# Patient Record
Sex: FEMALE | HISPANIC OR LATINO | Employment: UNEMPLOYED
[De-identification: names, ages, dates, MRNs, and addresses within clinical notes are randomized per-mention and may not be internally consistent; named-entity substitution may affect disease eponyms.]

---

## 2018-07-15 ENCOUNTER — HEALTH MAINTENANCE LETTER (OUTPATIENT)
Age: 38
End: 2018-07-15

## 2018-08-23 ENCOUNTER — APPOINTMENT (OUTPATIENT)
Dept: GENERAL RADIOLOGY | Facility: CLINIC | Age: 38
End: 2018-08-23
Attending: EMERGENCY MEDICINE
Payer: MEDICAID

## 2018-08-23 ENCOUNTER — HOSPITAL ENCOUNTER (EMERGENCY)
Facility: CLINIC | Age: 38
Discharge: HOME | End: 2018-08-23
Attending: EMERGENCY MEDICINE | Admitting: EMERGENCY MEDICINE
Payer: MEDICAID

## 2018-08-23 VITALS
RESPIRATION RATE: 18 BRPM | HEART RATE: 82 BPM | OXYGEN SATURATION: 98 % | TEMPERATURE: 97.8 F | SYSTOLIC BLOOD PRESSURE: 152 MMHG | DIASTOLIC BLOOD PRESSURE: 87 MMHG

## 2018-08-23 DIAGNOSIS — R51.9 ACUTE NONINTRACTABLE HEADACHE, UNSPECIFIED HEADACHE TYPE: ICD-10-CM

## 2018-08-23 DIAGNOSIS — R42 LIGHTHEADEDNESS: ICD-10-CM

## 2018-08-23 DIAGNOSIS — R07.89 ATYPICAL CHEST PAIN: ICD-10-CM

## 2018-08-23 LAB
ANION GAP SERPL CALCULATED.3IONS-SCNC: 8 MMOL/L (ref 3–14)
B-HCG FREE SERPL-ACNC: <5 IU/L
BASOPHILS # BLD AUTO: 0 10E9/L (ref 0–0.2)
BASOPHILS NFR BLD AUTO: 0.3 %
BUN SERPL-MCNC: 10 MG/DL (ref 7–30)
CALCIUM SERPL-MCNC: 8.7 MG/DL (ref 8.5–10.1)
CHLORIDE SERPL-SCNC: 102 MMOL/L (ref 94–109)
CO2 SERPL-SCNC: 27 MMOL/L (ref 20–32)
CREAT SERPL-MCNC: 0.71 MG/DL (ref 0.52–1.04)
D DIMER PPP FEU-MCNC: <0.3 UG/ML FEU (ref 0–0.5)
DIFFERENTIAL METHOD BLD: NORMAL
EOSINOPHIL # BLD AUTO: 0.2 10E9/L (ref 0–0.7)
EOSINOPHIL NFR BLD AUTO: 2.5 %
ERYTHROCYTE [DISTWIDTH] IN BLOOD BY AUTOMATED COUNT: 13.3 % (ref 10–15)
GFR SERPL CREATININE-BSD FRML MDRD: >90 ML/MIN/1.7M2
GLUCOSE SERPL-MCNC: 96 MG/DL (ref 70–99)
HCT VFR BLD AUTO: 42.2 % (ref 35–47)
HGB BLD-MCNC: 13.5 G/DL (ref 11.7–15.7)
IMM GRANULOCYTES # BLD: 0 10E9/L (ref 0–0.4)
IMM GRANULOCYTES NFR BLD: 0.3 %
INTERPRETATION ECG - MUSE: NORMAL
LYMPHOCYTES # BLD AUTO: 1.8 10E9/L (ref 0.8–5.3)
LYMPHOCYTES NFR BLD AUTO: 26.4 %
MCH RBC QN AUTO: 28 PG (ref 26.5–33)
MCHC RBC AUTO-ENTMCNC: 32 G/DL (ref 31.5–36.5)
MCV RBC AUTO: 87 FL (ref 78–100)
MONOCYTES # BLD AUTO: 0.4 10E9/L (ref 0–1.3)
MONOCYTES NFR BLD AUTO: 6.4 %
NEUTROPHILS # BLD AUTO: 4.3 10E9/L (ref 1.6–8.3)
NEUTROPHILS NFR BLD AUTO: 64.1 %
NRBC # BLD AUTO: 0 10*3/UL
NRBC BLD AUTO-RTO: 0 /100
PLATELET # BLD AUTO: 378 10E9/L (ref 150–450)
POTASSIUM SERPL-SCNC: 3.5 MMOL/L (ref 3.4–5.3)
RBC # BLD AUTO: 4.83 10E12/L (ref 3.8–5.2)
SODIUM SERPL-SCNC: 137 MMOL/L (ref 133–144)
TROPONIN I SERPL-MCNC: <0.015 UG/L (ref 0–0.04)
WBC # BLD AUTO: 6.7 10E9/L (ref 4–11)

## 2018-08-23 PROCEDURE — 84484 ASSAY OF TROPONIN QUANT: CPT | Performed by: EMERGENCY MEDICINE

## 2018-08-23 PROCEDURE — 96374 THER/PROPH/DIAG INJ IV PUSH: CPT

## 2018-08-23 PROCEDURE — 85379 FIBRIN DEGRADATION QUANT: CPT | Performed by: EMERGENCY MEDICINE

## 2018-08-23 PROCEDURE — 99284 EMERGENCY DEPT VISIT MOD MDM: CPT | Mod: 25

## 2018-08-23 PROCEDURE — 80048 BASIC METABOLIC PNL TOTAL CA: CPT | Performed by: EMERGENCY MEDICINE

## 2018-08-23 PROCEDURE — 85025 COMPLETE CBC W/AUTO DIFF WBC: CPT | Performed by: EMERGENCY MEDICINE

## 2018-08-23 PROCEDURE — 71046 X-RAY EXAM CHEST 2 VIEWS: CPT

## 2018-08-23 PROCEDURE — 93005 ELECTROCARDIOGRAM TRACING: CPT

## 2018-08-23 PROCEDURE — 25000128 H RX IP 250 OP 636: Performed by: EMERGENCY MEDICINE

## 2018-08-23 PROCEDURE — 84702 CHORIONIC GONADOTROPIN TEST: CPT

## 2018-08-23 RX ORDER — LIDOCAINE 40 MG/G
CREAM TOPICAL
Status: DISCONTINUED | OUTPATIENT
Start: 2018-08-23 | End: 2018-08-23 | Stop reason: HOSPADM

## 2018-08-23 RX ORDER — KETOROLAC TROMETHAMINE 15 MG/ML
15 INJECTION, SOLUTION INTRAMUSCULAR; INTRAVENOUS ONCE
Status: COMPLETED | OUTPATIENT
Start: 2018-08-23 | End: 2018-08-23

## 2018-08-23 RX ADMIN — KETOROLAC TROMETHAMINE 15 MG: 15 INJECTION, SOLUTION INTRAMUSCULAR; INTRAVENOUS at 15:28

## 2018-08-23 ASSESSMENT — ENCOUNTER SYMPTOMS
SHORTNESS OF BREATH: 1
FEVER: 0
HEADACHES: 1
BACK PAIN: 0
ABDOMINAL PAIN: 0
NAUSEA: 0
COUGH: 0
VOMITING: 0
DIZZINESS: 1

## 2018-08-23 NOTE — ED PROVIDER NOTES
History     Chief Complaint:  Multiple Complaints    HPI   Bindu Forte is a 38 year old female who presents with multiple complaints. The patient stated that she has had left sided chest pain for roughly a month, along with lightheadedness and shortness of breath. Both these symptoms worsen wit activity, as stated by the patient. Along with the chest pain and lightheadedness, she had been having intermittent headaches for the past 2 weeks, raising concern. Today, she had an episode of lightheadedness that made her think she was going to faint, prompting her to visit the ED today. The patient stated that her father passed away 3 months ago, and that she has had a lot of other stressors that could be playing in a part in her symptoms. The patient also shared that she had her IUD taken out 2 weeks ago. The patient denies any urinary or bowel symptoms, cough, fevers, abdominal pain, back pain, numbness, or any other associated symptoms.     Allergies:  No Known Drug Allergies    Medications:    Mirena    Past Medical History:    Postpartum depression    Past Surgical History:    History reviewed. No pertinent past surgical history.    Family History:    The patient denies any relevant family medical history.    Social History:  Marital Status:   Smoking Status: Never  Alcohol Use: No    Review of Systems   Constitutional: Negative for fever.   Respiratory: Positive for shortness of breath. Negative for cough.    Cardiovascular: Positive for chest pain.   Gastrointestinal: Negative for abdominal pain, nausea and vomiting.   Musculoskeletal: Negative for back pain.   Neurological: Positive for dizziness and headaches.   All other systems reviewed and are negative.    Physical Exam     Patient Vitals for the past 24 hrs:   BP Temp Pulse Heart Rate Resp SpO2   08/23/18 1515 - - - - - 100 %   08/23/18 1405 152/87 97.8  F (36.6  C) 82 82 18 100 %       Physical Exam  General: Adult female sitting  upright.  Eyes: PERRL, Conjunctive within normal limits  ENT: Moist mucous membranes, oropharynx clear.   Neck: no rigidity. Nontender.  CV: Normal S1S2, no murmur, rub or gallop. Regular rate and rhythm. Radial pulses equal and intact.  Resp: Clear to auscultation bilaterally, no wheezes, rales or rhonchi. Normal respiratory effort.  GI: Abdomen is soft, nontender and nondistended. No palpable masses. No rebound or guarding.  MSK: No edema. Nontender. Normal active range of motion. No calf asymmetry or tenderness to palpation.   Skin: Warm and dry. No rashes or lesions or ecchymoses on visible skin.  Neuro: Alert and oriented. Responds appropriately to all questions and commands. No focal findings appreciated. Normal muscle tone.  Psych: Normal mood and affect. Pleasant.      Emergency Department Course   ECG:  ECG taken at 1412, ECG read at 1444  Normal sinus rhythm  Nonspecific T wave abnormality  Prolonged QT  Abnormal ECG  Rate 92 bpm. RI interval 128. QRS duration 74. QT/QTc 374/462. P-R-T axes 30 80 22.    Imaging:   Radiographic findings were communicated with the patient who voiced understanding of the findings.    Chest XR, PA & LAT:  Since August 13, 2005, heart size remains normal. No  pleural effusion, pneumothorax, or abnormal area of consolidation.  Per Radiologist.     Laboratory:  CBC: WNL. (WBC 6.7, HGB 13.5, )   BMP: AW NL. (Creatinine 0.71)  D dimer: <0.3  Troponin 1: <0.015  HCG: <5.0    Interventions:  1528: 15 mg Toradol IV    Emergency Department Course:  Past medical records, nursing notes, and vitals reviewed.  1440: I performed an exam of the patient and obtained history, as documented above.    EKG obtained in the ED, see results above.   IV inserted and blood drawn.  The patient was taken for chest XR, see imaging results above.     Patient reassessed. No new concerns. Feels improved.     I rechecked the patient. Findings and plan explained to the Patient. Patient discharged home  with instructions regarding supportive care, medications, and reasons to return. The importance of close follow-up was reviewed.       Impression & Plan      Medical Decision Making:  Bindu Forte is a 38 year old female with a previous history of headaches who presents to the ED for evaluation of concern for headache dizziness and chest pain. It has been intermittent for the last month. Her father  3 moths ago and has been dealing with many situational stressors but is not suicidal or severely depressed. She does not have an established PCP or someone to talk to about this. With regards to her chest pain headache and dizziness, there are no worry some findings on screening test to suggest acute cardio pulmonary cause for symptoms. She also has no neurologic deficits and she describes the headache as mild and it comes and goes. There's no indication for head CT today. Head CT from  was reviewed. She had no EKG abnormalities or concerning findings. It appears that she was hydrated here, and in the case she was mildly dehydrated. She was feeling improved on re assessment. She is recommended to follow up with PCP within 3 days. Come to the ED if any new or worsening symptoms. All questions answered prior to discharge.     Diagnosis:    ICD-10-CM    1. Acute nonintractable headache, unspecified headache type R51    2. Lightheadedness R42    3. Atypical chest pain R07.89        Disposition:  discharged to home          Milton Alanis  2018   [facility] EMERGENCY DEPARTMENT    I, Milton Alanis, am serving as a scribe at 2:40 PM on 2018 to document services personally performed by Jenna Rahman MD based on my observations and the provider's statements to me.        Jenna Rahman MD  18 1548

## 2018-08-23 NOTE — ED TRIAGE NOTES
Patient presents with complaints of chest pain for a month. This has not been evaluated yet. Patient also states that she has been having a headache and dizziness for a week as well.  ABC intact without need for intervention at this time.

## 2018-08-23 NOTE — LETTER
08/23/18      To Whom it may concern:    Taras Lyons was in our Emergency Department today, 08/23/18. with a patient who needed their assistance.  Please excuse them from work.      Sincerely,      Lilly Can RN, BSN

## 2018-08-23 NOTE — DISCHARGE INSTRUCTIONS
* Dolor En El Pecho:Causa No Determinada [Chest Pain, Uncertain Cause]    Según mehta examen de hoy, no se pudo determinar exactamente por qué siente dolor en el pecho. Mehta afección no parece seria en kiley momento y el dolor que siente no parece provenir del corazón. Sin embargo, en ocasiones, los síntomas de un problema keegan tardan más en aparecer. Por lo tanto, es importante que preste atención a las advertencias descritas a continuación.  Cuidados En [place]:  1. Descanse hoy y evite toda actividad agotadora.  2. [place] el medicamento que le hayan recetado según le hayan indicado.  Programe randal VISITA DE CONTROL con mehta médico en 1-3 días.  Busque Prontamente Atención Médica  si algo de lo siguiente ocurre:    Un cambio en el tipo de dolor: se siente diferente, se ha vuelto más ethan, dura más o comienza a esparcirse al hombro, el brazo, el corby, la mandíbula o la espalda.    Dificultad para respirar o más dolor al respirar.    Debilidad, mareo o desmayo.    Tos con expulsión de esputo (flema [phlegm]) de color oscuro o con bob.    Fiebre de 101 F (38.3 C) o más leo.    Dolor, enrojecimiento o hinchazón de randal pierna.    0338-0352 [employer]. [address] [place] [address], [place] [address]. All rights reserved. This information is not intended as a substitute for professional medical care. Always follow your healthcare professional's instructions.  This information has been modified by your health care provider with permission from the publisher.          Mareo [Dizziness, Uncertain Cause]  El mareo (dizziness) es un síntoma común, que en ocasiones se describe pedrito  sentirse aturdido  (lightheaded) o  sentir que sabrina está a punto de desmayarse  (faint). Si dura sólo unos segundos y se relaciona con un cambio de posición (por ejemplo, levantarse después de rashaun estado mucho tiempo sentado o acostado), no suele ser signo de ninguna afección seria. En cambio, un mareo (dizziness) que dura minutos u  horas, o que aparece sin motivo aparente, puede ser randal señal de un problema más keegan (por ejemplo, deshidratación [dehydration], randal reacción a un medicamento, randal enfermedad del corazón [heart disease] o del cerebro [brain disease]).  El examen que le hicieron hoy no nos permitió establecer con certeza a qué se debió mehta mareo. En ocasiones, es necesario hacer más pruebas para poder encontrar la causa. Por lo tanto, es importante que dung las visitas de control con mehta médico si los síntomas continúan.  Cuidados En [place]:  1) Si tiene un mareo que dura más que unos pocos segundos, recuéstese hasta que haya pasado. Así, si llegase a desmayarse, no se lastimará con la caída.  2) No conduzca ni opere ninguna máquina peligrosa hasta que los mareos hayan cesado raad, al menos, 48 horas.  3) Si usted se marea al ponerse de pie en forma repentina, puede ser un signo de deshidratación leve (mild dehydration). En los próximos días, francine cantidades adicionales de líquidos.  4) Si hace poco que comenzó a lily un medicamento nuevo o le aumentaron la dosis de mehta medicamento actual (especialmente, si se trata de un medicamento para tratar la presión arterial [blood pressure medicine]), explique henrik síntomas al médico que le recetó el medicamento. Quizás deba ajustarle la dosis.  Si los síntomas continúan, programe randal VISITA DE CONTROL con mehta médico en los próximos siete días para que lo evalúen mejor.  Busque Prontamente Atención Médica  si algo de lo siguiente ocurre:  -- Los síntomas empeoran.  -- Desmayo, dolor de stephon o convulsiones (seizure).  -- Vómito persistente.  -- Siente que usted o el cuarto stephanie vueltas.  -- Dolor en el pecho, el brazo, la espalda, el corby o la mandíbula.  -- Palpitaciones (palpitations) [la sensación de que el corazón está agitado, late con rapidez o ethan].  -- Falta de aire.  -- Matthew en el vómito o en la materia fecal (de color negruzco o rojizo).  -- Debilidad en un brazo o randal  juanita, o en un lado de la aquiles.  -- Dificultades para hablar o jonny.  Date Last Reviewed: 8/23/2015 2000-2017 [employer]. [address] [place] [id number]. Todos los derechos reservados. Esta información no pretende sustituir la atención médica profesional. Sólo mehta médico puede diagnosticar y tratar un problema de rodolfo.          * Dolor De Brian [Headache, Unspecified]    No se ha podido determinar con certeza cuál es la causa de mehta dolor de brian de timmy, eddie no parece rashaun señales de randal enfermedad seria.  Con el estrés, algunas personas tensan los músculos de los hombros, del corby y el cuero cabelludo sin darse cuenta. Si lo hacen raad cierto tiempo, puede darse un DOLOR DE BRIAN POR TENSIÓN (TENSION HEADACHE).  El DOLOR DE BRIAN POR MIGRAÑA (MIGRAINE HEADACHE) se debe a cambios en el flujo sanguíneo del cerebro. Un ataque de migraña puede desencadenarse por estrés emocional, cambios hormonales raad el ciclo menstrual, anticonceptivos orales, el consumo de alcohol, ciertos alimentos que contienen tiramina (tyramine), esfuerzo de los ojos, cambios del clima, saltearse comidas, falta de sueño o dormir demasiadas horas.  Otras causas que pueden ocasionar un dolor de brian son: randal enfermedad viral (viral illness),randal infección de los senos paranasales (sinus infection), del oído (ear infection) o de la garganta (throat infection), dolor en los dientes o muelas (dental pain) y dolor en la mandíbula (jaw joint pain).  Cuidados En [place]:  1. Si le dieron algún calmante (analgésico [pain medicine]) para kiley dolor de brian, no conduzca hasta mehta casa. En cambio, coordine para que otra persona lo lleve. Cuando llegue a mehta casa, intente dormir. Se sentirá mucho mejor cuando despierte.  2. Si tiene náuseas (nausea) o vómito (vomiting), siga radnal dieta liviana hasta que el dolor de brian haya desaparecido.  3. Si tiene un dolor de brian de tipo migraña, use lentes de sol cuando  salga a la awilda del día o se encuentre en lugares interiores con iluminación brillante, hasta que henrik síntomas se alivien. La awilda nicole y muy brillante puede empeorar kiley tipo de dolor de stephon.  Programe randal VISITA DE CONTROL con mehta médico si no empieza a sentirse mejor raad las próximas 24 horas. Si tiene cadence de stephon frecuentes, debería conversar sobre un plan de tratamiento con mehta médico de atención de primaria. Si está atento a los primeros signos del dolor de stephon y comienza el tratamiento de inmediato, quizás pueda detener el dolor usted mismo.  Busque Prontamente Atención Médica  si algo de lo siguiente ocurre:    El dolor de stephon empeora o no se courtney en las próximas 24 horas.    Vómito persistente (no puede mantener los líquidos en el estómago).    Fiebre de 101 F (38.3 C) o más leo.    Rigidez en el corby.    Gran somnolencia, confusión, o desmayo.    Debilidad en un brazo o randal pierna, o en un lado de la aquiles.    Dificultades para hablar o jonny.    9388-4352 [employer]. [address] [place] [address], [place] [address]. All rights reserved. This information is not intended as a substitute for professional medical care. Always follow your healthcare professional's instructions.  This information has been modified by your health care provider with permission from the publisher.

## 2018-08-23 NOTE — ED AVS SNAPSHOT
[facility] Emergency Department    [address]    [facility] [address]    Phone:  [phone]    Fax:  [phone]                                       Bindu Forte   MRN: [id number]    Department:  [facility] Emergency Department   Date of Visit:  8/23/2018           After Visit Summary Signature Page     I have received my discharge instructions, and my questions have been answered. I have discussed any challenges I see with this plan with the nurse or doctor.    ..........................................................................................................................................  Patient/Patient Representative Signature      ..........................................................................................................................................  Patient Representative Print Name and Relationship to Patient    ..................................................               ................................................  Date                                            Time    ..........................................................................................................................................  Reviewed by Signature/Title    ...................................................              ..............................................  Date                                                            Time          22EPIC Rev 08/18

## 2018-08-23 NOTE — ED AVS SNAPSHOT
[facility] Emergency Department    [address]    [facility] [address]    Phone:  [phone]    Fax:  [phone]                                       Bindu Forte   MRN: [id number]    Department:  [facility] Emergency Department   Date of Visit:  8/23/2018           Patient Information     Date Of Birth          1980        Your diagnoses for this visit were:     Acute nonintractable headache, unspecified headache type     Lightheadedness     Atypical chest pain        You were seen by Jenna Rahman MD.      Follow-up Information     Follow up with Primary clinic.    Why:  within 3 days for reassessment        Follow up with [facility] Emergency Department.    Specialty:  EMERGENCY MEDICINE    Why:  As needed, If symptoms worsen    Contact information:    [address] [facility] [address]  [phone]        Discharge Instructions         * Dolor En El Pecho:Causa No Determinada [Chest Pain, Uncertain Cause]    Según alexander examen de timmy, no se pudo determinar exactamente por qué siente dolor en el pecho. Alexander afección no parece seria en kiley momento y el dolor que siente no parece provenir del corazón. Sin embargo, en ocasiones, los síntomas de un problema keegan tardan más en aparecer. Por lo tanto, es importante que preste atención a las advertencias descritas a continuación.  Cuidados En [place]:  1. Descanse hoy y evite toda actividad agotadora.  2. [place] el medicamento que le hayan recetado según le hayan indicado.  Programe randal VISITA DE CONTROL con alexander médico en 1-3 días.  Busque Prontamente Atención Médica  si algo de lo siguiente ocurre:    Un cambio en el tipo de dolor: se siente diferente, se ha vuelto más ethan, dura más o comienza a esparcirse al hombro, el brazo, el corby, la mandíbula o la espalda.    Dificultad para respirar o más dolor al respirar.    Debilidad, mareo o desmayo.    Tos con expulsión de  esputo (flema [phlegm]) de color oscuro o con bob.    Fiebre de 101 F (38.3 C) o más leo.    Dolor, enrojecimiento o hinchazón de randal juanita.    5585-4901 [employer]. [address] [place] [address] 18573. All rights reserved. This information is not intended as a substitute for professional medical care. Always follow your healthcare professional's instructions.  This information has been modified by your health care provider with permission from the publisher.          Mareo [Dizziness, Uncertain Cause]  El mareo (dizziness) es un síntoma común, que en ocasiones se describe pedrito  sentirse aturdido  (lightheaded) o  sentir que sabrina está a punto de desmayarse  (faint). Si dura sólo unos segundos y se relaciona con un cambio de posición (por ejemplo, levantarse después de rashaun estado mucho tiempo sentado o acostado), no suele ser signo de ninguna afección seria. En cambio, un mareo (dizziness) que dura minutos u horas, o que aparece sin motivo aparente, puede ser randal señal de un problema más keegan (por ejemplo, deshidratación [dehydration], randal reacción a un medicamento, randal enfermedad del corazón [heart disease] o del cerebro [brain disease]).  El examen que le hicieron hoy no nos permitió establecer con certeza a qué se debió mehta mareo. En ocasiones, es necesario hacer más pruebas para poder encontrar la causa. Por lo tanto, es importante que dung las visitas de control con mehta médico si los síntomas continúan.  Cuidados En La [place]:  1) Si tiene un mareo que dura más que unos pocos segundos, recuéstese hasta que haya pasado. Así, si llegase a desmayarse, no se lastimará con la caída.  2) No conduzca ni opere ninguna máquina peligrosa hasta que los mareos hayan cesado raad, al menos, 48 horas.  3) Si usted se marea al ponerse de pie en forma repentina, puede ser un signo de deshidratación leve (mild dehydration). En los próximos días, francine cantidades adicionales de líquidos.  4) Si hace poco  que comenzó a lily un medicamento nuevo o le aumentaron la dosis de mehta medicamento actual (especialmente, si se trata de un medicamento para tratar la presión arterial [blood pressure medicine]), explique henrik síntomas al médico que le recetó el medicamento. Quizás deba ajustarle la dosis.  Si los síntomas continúan, programe randal VISITA DE CONTROL con mehta médico en los próximos siete días para que lo evalúen mejor.  Busque Prontamente Atención Médica  si algo de lo siguiente ocurre:  -- Los síntomas empeoran.  -- Desmayo, dolor de brian o convulsiones (seizure).  -- Vómito persistente.  -- Siente que usted o el cuarto stephanie vueltas.  -- Dolor en el pecho, el brazo, la espalda, el corby o la mandíbula.  -- Palpitaciones (palpitations) [la sensación de que el corazón está agitado, late con rapidez o ethan].  -- Falta de aire.  -- Matthew en el vómito o en la materia fecal (de color negruzco o rojizo).  -- Debilidad en un brazo o randal pierna, o en un lado de la aquiles.  -- Dificultades para hablar o jonny.  Date Last Reviewed: 8/23/2015 2000-2017 [employer]. [address] [place] [address] 19205. Todos los derechos reservados. Esta información no pretende sustituir la atención médica profesional. Sólo mehta médico puede diagnosticar y tratar un problema de rodolfo.          * Dolor De Brian [Headache, Unspecified]    No se ha podido determinar con certeza cuál es la causa de mehta dolor de brian de hoy, eddie no parece rashaun señales de randal enfermedad seria.  Con el estrés, algunas personas tensan los músculos de los hombros, del corby y el cuero cabelludo sin darse cuenta. Si lo hacen raad cierto tiempo, puede darse un DOLOR DE BRIAN POR TENSIÓN (TENSION HEADACHE).  El DOLOR DE BRIAN POR MIGRAÑA (MIGRAINE HEADACHE) se debe a cambios en el flujo sanguíneo del cerebro. Un ataque de migraña puede desencadenarse por estrés emocional, cambios hormonales raad el ciclo menstrual, anticonceptivos orales, el  consumo de alcohol, ciertos alimentos que contienen tiramina (tyramine), esfuerzo de los ojos, cambios del clima, saltearse comidas, falta de sueño o dormir demasiadas horas.  Otras causas que pueden ocasionar un dolor de stephon son: randal enfermedad viral (viral illness),randal infección de los senos paranasales (sinus infection), del oído (ear infection) o de la garganta (throat infection), dolor en los dientes o muelas (dental pain) y dolor en la mandíbula (jaw joint pain).  Cuidados En [place]:  1. Si le dieron algún calmante (analgésico [pain medicine]) para kiley dolor de stephon, no conduzca hasta mehta casa. En cambio, coordine para que otra persona lo lleve. Cuando llegue a mehta casa, intente dormir. Se sentirá mucho mejor cuando despierte.  2. Si tiene náuseas (nausea) o vómito (vomiting), siga randal dieta liviana hasta que el dolor de stephon haya desaparecido.  3. Si tiene un dolor de stephon de tipo migraña, use lentes de sol cuando salga a la awilda del día o se encuentre en lugares interiores con iluminación brillante, hasta que henrik síntomas se alivien. La awilda nicole y muy brillante puede empeorar kiley tipo de dolor de stephon.  Programe randal VISITA DE CONTROL con mehta médico si no empieza a sentirse mejor raad las próximas 24 horas. Si tiene cadence de stephon frecuentes, debería conversar sobre un plan de tratamiento con mehta médico de atención de primaria. Si está atento a los primeros signos del dolor de stephon y comienza el tratamiento de inmediato, quizás pueda detener el dolor usted mismo.  Busque Prontamente Atención Médica  si algo de lo siguiente ocurre:    El dolor de stephon empeora o no se courtney en las próximas 24 horas.    Vómito persistente (no puede mantener los líquidos en el estómago).    Fiebre de 101 F (38.3 C) o más leo.    Rigidez en el corby.    Gran somnolencia, confusión, o desmayo.    Debilidad en un brazo o randal pierna, o en un lado de la aquiles.    Dificultades para hablar o jonny.    [id number] [employer]. [address] [place] [address] 36559. All rights reserved. This information is not intended as a substitute for professional medical care. Always follow your healthcare professional's instructions.  This information has been modified by your health care provider with permission from the publisher.          Discharge References/Attachments     GRIEF AND LOSS ([place])    GRIEF AND LOSS, HELPING YOURSELF THROUGH ([place])      24 Hour Appointment Hotline       To make an appointment at any [facility], call [phone] ([phone]). If you don't have a family doctor or clinic, we will help you find one. [facility] clinics are conveniently located to serve the needs of you and your family.             Review of your medicines      Our records show that you are taking the medicines listed below. If these are incorrect, please call your family doctor or clinic.        Dose / Directions Last dose taken    ibuprofen 800 MG tablet   Commonly known as:  ADVIL/MOTRIN   Dose:  800 mg   Quantity:  90 tablet        Take 1 tablet (800 mg) by mouth once as needed for moderate pain (mild-moderate pain)   Refills:  0        levonorgestrel 20 MCG/24HR IUD   Commonly known as:  MIRENA   Dose:  1 each   Quantity:  1 each        1 each (20 mcg) by Intrauterine route once for 1 dose   Refills:  0        prenatal multivitamin plus iron 27-0.8 MG Tabs per tablet   Dose:  1 tablet   Quantity:  100 tablet        Take 1 tablet by mouth daily   Refills:  3                Procedures and tests performed during your visit     Basic metabolic panel    CBC with platelets differential    Chest XR,  PA & LAT    D dimer quantitative    EKG 12-lead, tracing only    ISTAT HCG Quantitative Pregnancy POCT    Peripheral IV catheter    Pulse oximetry nursing    Troponin I (now)      Orders Needing Specimen Collection     None      Pending Results     Date and Time Order Name Status Description    8/23/2018 0670 Chest XR,   PA & LAT Preliminary             Pending Culture Results     No orders found from 8/21/2018 to 8/24/2018.            Pending Results Instructions     If you had any lab results that were not finalized at the time of your Discharge, you can call the ED Lab Result RN at [phone]. You will be contacted by this team for any positive Lab results or changes in treatment. The nurses are available 7 days a week from 10A to 6:30P.  You can leave a message 24 hours per day and they will return your call.        Test Results From Your Hospital Stay        8/23/2018  3:17 PM      Component Results     Component Value Ref Range & Units Status    WBC 6.7 4.0 - 11.0 10e9/L Final    RBC Count 4.83 3.8 - 5.2 10e12/L Final    Hemoglobin 13.5 11.7 - 15.7 g/dL Final    Hematocrit 42.2 35.0 - 47.0 % Final    MCV 87 78 - 100 fl Final    MCH 28.0 26.5 - 33.0 pg Final    MCHC 32.0 31.5 - 36.5 g/dL Final    RDW 13.3 10.0 - 15.0 % Final    Platelet Count 378 150 - 450 10e9/L Final    Diff Method Automated Method  Final    % Neutrophils 64.1 % Final    % Lymphocytes 26.4 % Final    % Monocytes 6.4 % Final    % Eosinophils 2.5 % Final    % Basophils 0.3 % Final    % Immature Granulocytes 0.3 % Final    Nucleated RBCs 0 0 /100 Final    Absolute Neutrophil 4.3 1.6 - 8.3 10e9/L Final    Absolute Lymphocytes 1.8 0.8 - 5.3 10e9/L Final    Absolute Monocytes 0.4 0.0 - 1.3 10e9/L Final    Absolute Eosinophils 0.2 0.0 - 0.7 10e9/L Final    Absolute Basophils 0.0 0.0 - 0.2 10e9/L Final    Abs Immature Granulocytes 0.0 0 - 0.4 10e9/L Final    Absolute Nucleated RBC 0.0  Final         8/23/2018  3:33 PM      Component Results     Component Value Ref Range & Units Status    Sodium 137 133 - 144 mmol/L Final    Potassium 3.5 3.4 - 5.3 mmol/L Final    Chloride 102 94 - 109 mmol/L Final    Carbon Dioxide 27 20 - 32 mmol/L Final    Anion Gap 8 3 - 14 mmol/L Final    Glucose 96 70 - 99 mg/dL Final    Urea Nitrogen 10 7 - 30 mg/dL Final    Creatinine 0.71  0.52 - 1.04 mg/dL Final    GFR Estimate >90 >60 mL/min/1.7m2 Final    Non  GFR Calc    GFR Estimate If Black >90 >60 mL/min/1.7m2 Final    African American GFR Calc    Calcium 8.7 8.5 - 10.1 mg/dL Final         8/23/2018  3:30 PM      Component Results     Component Value Ref Range & Units Status    D Dimer <0.3 0.0 - 0.50 ug/ml FEU Final    This D-dimer assay is intended for use in conjunction with a clinical pretest   probability assessment model to exclude pulmonary embolism (PE) and deep   venous thrombosis (DVT) in outpatients suspected of PE or DVT. The cut-off   value is 0.5 ug/mL FEU.           8/23/2018  3:36 PM      Component Results     Component Value Ref Range & Units Status    Troponin I ES <0.015 0.000 - 0.045 ug/L Final    The 99th percentile for upper reference range is 0.045 ug/L.  Troponin values   in the range of 0.045 - 0.120 ug/L may be associated with risks of adverse   clinical events.           8/23/2018  4:13 PM      Narrative     CHEST TWO VIEWS  8/23/2018 4:09 PM     HISTORY: 38-year-old woman with chest pain.         Impression     IMPRESSION: Since August 13, 2005, heart size remains normal. No  pleural effusion, pneumothorax, or abnormal area of consolidation.         8/23/2018  3:22 PM      Component Results     Component Value Ref Range & Units Status    HCG Quantitative Serum <5.0 <5.0 IU/L Final                Clinical Quality Measure: Blood Pressure Screening     Your blood pressure was checked while you were in the emergency department today. The last reading we obtained was  BP: 152/87 . Please read the guidelines below about what these numbers mean and what you should do about them.  If your systolic blood pressure (the top number) is less than 120 and your diastolic blood pressure (the bottom number) is less than 80, then your blood pressure is normal. There is nothing more that you need to do about it.  If your systolic blood pressure (the top number) is  "120-139 or your diastolic blood pressure (the bottom number) is 80-89, your blood pressure may be higher than it should be. You should have your blood pressure rechecked within a year by a primary care provider.  If your systolic blood pressure (the top number) is 140 or greater or your diastolic blood pressure (the bottom number) is 90 or greater, you may have high blood pressure. High blood pressure is treatable, but if left untreated over time it can put you at risk for heart attack, stroke, or kidney failure. You should have your blood pressure rechecked by a primary care provider within the next 4 weeks.  If your provider in the emergency department today gave you specific instructions to follow-up with your doctor or provider even sooner than that, you should follow that instruction and not wait for up to 4 weeks for your follow-up visit.        Thank you for choosing [facility]       Thank you for choosing [facility] for your care. Our goal is always to provide you with excellent care. Hearing back from our patients is one way we can continue to improve our services. Please take a few minutes to complete the written survey that you may receive in the mail after you visit with us. Thank you!        [employer] Information     [employer] lets you send messages to your doctor, view your test results, renew your prescriptions, schedule appointments and more. To sign up, go to [email or web address] . Click on \"Log in\" on the left side of the screen, which will take you to the Welcome page. Then click on \"Sign up Now\" on the right side of the page.     You will be asked to enter the access code listed below, as well as some personal information. Please follow the directions to create your username and password.     Your access code is: [email or web address]  Expires: 2018  4:24 PM     Your access code will  in 90 days. If you need help or a new code, please call your [facility] clinic or [phone].        Care " EveryWhere ID     This is your Care EveryWhere ID. This could be used by other organizations to access your [place] medical records  [id number]        Equal Access to Services     DALILA REICH : Gail Vargas, vale faith, jaylyn wolfe, blanca lou. So [facility] [phone].    ATENCIÓN: Si habla español, tiene a mehta disposición servicios gratuitos de asistencia lingüística. Llame al [phone].    We comply with applicable federal civil rights laws and [place] laws. We do not discriminate on the basis of race, color, national origin, age, disability, sex, sexual orientation, or gender identity.            After Visit Summary       This is your record. Keep this with you and show to your community pharmacist(s) and doctor(s) at your next visit.

## 2018-10-01 ENCOUNTER — TRANSFERRED RECORDS (OUTPATIENT)
Dept: HEALTH INFORMATION MANAGEMENT | Facility: CLINIC | Age: 38
End: 2018-10-01

## 2018-10-01 LAB — PAP SMEAR - HIM PATIENT REPORTED: NEGATIVE

## 2019-01-10 ENCOUNTER — HOSPITAL ENCOUNTER (EMERGENCY)
Facility: CLINIC | Age: 39
Discharge: HOME | End: 2019-01-11
Attending: EMERGENCY MEDICINE | Admitting: EMERGENCY MEDICINE

## 2019-01-10 ENCOUNTER — APPOINTMENT (OUTPATIENT)
Dept: ULTRASOUND IMAGING | Facility: CLINIC | Age: 39
End: 2019-01-10

## 2019-01-10 ENCOUNTER — APPOINTMENT (OUTPATIENT)
Dept: GENERAL RADIOLOGY | Facility: CLINIC | Age: 39
End: 2019-01-10

## 2019-01-10 VITALS
HEIGHT: 66 IN | SYSTOLIC BLOOD PRESSURE: 120 MMHG | HEART RATE: 80 BPM | OXYGEN SATURATION: 100 % | BODY MASS INDEX: 24.69 KG/M2 | DIASTOLIC BLOOD PRESSURE: 72 MMHG | TEMPERATURE: 99.3 F | RESPIRATION RATE: 18 BRPM

## 2019-01-10 DIAGNOSIS — R05.9 COUGH: ICD-10-CM

## 2019-01-10 DIAGNOSIS — Z3A.01 LESS THAN 8 WEEKS GESTATION OF PREGNANCY: ICD-10-CM

## 2019-01-10 DIAGNOSIS — J11.1 INFLUENZA-LIKE ILLNESS: ICD-10-CM

## 2019-01-10 LAB
ALBUMIN UR-MCNC: NEGATIVE MG/DL
ANION GAP SERPL CALCULATED.3IONS-SCNC: 7 MMOL/L (ref 3–14)
APPEARANCE UR: CLEAR
B-HCG FREE SERPL-ACNC: >2000 IU/L
B-HCG SERPL-ACNC: 9858 IU/L (ref 0–5)
BACTERIA #/AREA URNS HPF: ABNORMAL /HPF
BASOPHILS # BLD AUTO: 0 10E9/L (ref 0–0.2)
BASOPHILS NFR BLD AUTO: 0.3 %
BILIRUB UR QL STRIP: NEGATIVE
BUN SERPL-MCNC: 9 MG/DL (ref 7–30)
CALCIUM SERPL-MCNC: 8.6 MG/DL (ref 8.5–10.1)
CHLORIDE SERPL-SCNC: 102 MMOL/L (ref 94–109)
CO2 SERPL-SCNC: 27 MMOL/L (ref 20–32)
COLOR UR AUTO: YELLOW
CREAT SERPL-MCNC: 0.68 MG/DL (ref 0.52–1.04)
DIFFERENTIAL METHOD BLD: NORMAL
EOSINOPHIL # BLD AUTO: 0.3 10E9/L (ref 0–0.7)
EOSINOPHIL NFR BLD AUTO: 5.5 %
ERYTHROCYTE [DISTWIDTH] IN BLOOD BY AUTOMATED COUNT: 13.7 % (ref 10–15)
GFR SERPL CREATININE-BSD FRML MDRD: >90 ML/MIN/{1.73_M2}
GLUCOSE SERPL-MCNC: 92 MG/DL (ref 70–99)
GLUCOSE UR STRIP-MCNC: NEGATIVE MG/DL
HCT VFR BLD AUTO: 42.7 % (ref 35–47)
HGB BLD-MCNC: 13.5 G/DL (ref 11.7–15.7)
HGB UR QL STRIP: ABNORMAL
IMM GRANULOCYTES # BLD: 0.1 10E9/L (ref 0–0.4)
IMM GRANULOCYTES NFR BLD: 0.8 %
KETONES UR STRIP-MCNC: NEGATIVE MG/DL
LEUKOCYTE ESTERASE UR QL STRIP: NEGATIVE
LYMPHOCYTES # BLD AUTO: 1.4 10E9/L (ref 0.8–5.3)
LYMPHOCYTES NFR BLD AUTO: 23.7 %
MCH RBC QN AUTO: 27.7 PG (ref 26.5–33)
MCHC RBC AUTO-ENTMCNC: 31.6 G/DL (ref 31.5–36.5)
MCV RBC AUTO: 88 FL (ref 78–100)
MONOCYTES # BLD AUTO: 0.7 10E9/L (ref 0–1.3)
MONOCYTES NFR BLD AUTO: 11.4 %
MUCOUS THREADS #/AREA URNS LPF: PRESENT /LPF
NEUTROPHILS # BLD AUTO: 3.5 10E9/L (ref 1.6–8.3)
NEUTROPHILS NFR BLD AUTO: 58.3 %
NITRATE UR QL: NEGATIVE
NRBC # BLD AUTO: 0 10*3/UL
NRBC BLD AUTO-RTO: 0 /100
PH UR STRIP: 5 PH (ref 5–7)
PLATELET # BLD AUTO: 365 10E9/L (ref 150–450)
POTASSIUM SERPL-SCNC: 3.4 MMOL/L (ref 3.4–5.3)
RBC # BLD AUTO: 4.88 10E12/L (ref 3.8–5.2)
RBC #/AREA URNS AUTO: 1 /HPF (ref 0–2)
SODIUM SERPL-SCNC: 136 MMOL/L (ref 133–144)
SOURCE: ABNORMAL
SP GR UR STRIP: 1.02 (ref 1–1.03)
SQUAMOUS #/AREA URNS AUTO: 5 /HPF (ref 0–1)
UROBILINOGEN UR STRIP-MCNC: NEGATIVE MG/DL (ref 0–2)
WBC # BLD AUTO: 6 10E9/L (ref 4–11)
WBC #/AREA URNS AUTO: 1 /HPF (ref 0–5)

## 2019-01-10 PROCEDURE — 80048 BASIC METABOLIC PNL TOTAL CA: CPT | Performed by: EMERGENCY MEDICINE

## 2019-01-10 PROCEDURE — 81001 URINALYSIS AUTO W/SCOPE: CPT | Performed by: EMERGENCY MEDICINE

## 2019-01-10 PROCEDURE — 71046 X-RAY EXAM CHEST 2 VIEWS: CPT

## 2019-01-10 PROCEDURE — 96360 HYDRATION IV INFUSION INIT: CPT

## 2019-01-10 PROCEDURE — 99285 EMERGENCY DEPT VISIT HI MDM: CPT | Mod: 25

## 2019-01-10 PROCEDURE — 25000128 H RX IP 250 OP 636: Performed by: EMERGENCY MEDICINE

## 2019-01-10 PROCEDURE — 76817 TRANSVAGINAL US OBSTETRIC: CPT

## 2019-01-10 PROCEDURE — 85025 COMPLETE CBC W/AUTO DIFF WBC: CPT | Performed by: EMERGENCY MEDICINE

## 2019-01-10 PROCEDURE — 84702 CHORIONIC GONADOTROPIN TEST: CPT | Performed by: EMERGENCY MEDICINE

## 2019-01-10 PROCEDURE — 84702 CHORIONIC GONADOTROPIN TEST: CPT

## 2019-01-10 RX ORDER — ACETAMINOPHEN 325 MG/1
325-650 TABLET ORAL EVERY 6 HOURS PRN
COMMUNITY
End: 2019-04-03

## 2019-01-10 RX ORDER — ACETAMINOPHEN 325 MG/1
650 TABLET ORAL EVERY 6 HOURS PRN
Qty: 30 TABLET | Refills: 0 | Status: SHIPPED | OUTPATIENT
Start: 2019-01-10 | End: 2019-04-03

## 2019-01-10 RX ADMIN — SODIUM CHLORIDE 1000 ML: 9 INJECTION, SOLUTION INTRAVENOUS at 21:35

## 2019-01-10 ASSESSMENT — ENCOUNTER SYMPTOMS
RHINORRHEA: 1
FEVER: 1
VOMITING: 0
ABDOMINAL PAIN: 1
DYSURIA: 0
COUGH: 1

## 2019-01-10 NOTE — LETTER
January 10, 2019      To Whom It May Concern:      Bindu Forte was seen in our Emergency Department today, 01/10/19.  I expect her condition to improve over the next  day.  She may return to work/school when improved.    Sincerely,        Halima Diallo RN

## 2019-01-10 NOTE — ED AVS SNAPSHOT
"    [facility] EMERGENCY DEPARTMENT: [phone]                                              INTERAGENCY TRANSFER FORM - LAB / IMAGING / EKG / EMG RESULTS   1/10/2019                   Hospital Admission Date: 1/10/2019  AARON ARCE   : 1980  Sex: Female         Attending Provider:  Inge Grubbs DO    Allergies:  No Known Allergies    Infection:  None   Service:  EMERGENCY ME    Ht:  1.676 m (5' 6\")   Wt:  --   Admission Wt:  --    BMI:  --   BSA:  --            Patient PCP Information     Provider PCP Type    Physician No Ref-Primary General         Lab Results - 3 Days      HCG QUANTitative pregnancy (blood) [[id number]] (Abnormal)  Resulted: 01/10/19 2232, Result status: Final result   Ordering provider:  Inge Grubbs DO  01/10/19 2124 Resulting lab:  [facility]    Specimen Information    Type Source Collected On   Blood   01/10/19 2134          Components    Component Value Reference Range Flag Lab   HCG Quantitative Serum 9,858 0 - 5 IU/L H FrRd   Comment:  Specimen run with a dilution            Basic metabolic panel [[id number]]  Resulted: 01/10/19 2213, Result status: Final result   Ordering provider:  Inge Grubbs DO  01/10/19 2124 Resulting lab:  [facility]    Specimen Information    Type Source Collected On   Blood   01/10/19 2134          Components    Component Value Reference Range Flag Lab   Sodium 136 133 - 144 mmol/L   FrRdHs   Potassium 3.4 3.4 - 5.3 mmol/L   FrRdHs   Chloride 102 94 - 109 mmol/L   FrRdHs   Carbon Dioxide 27 20 - 32 mmol/L   FrRdHs   Anion Gap 7 3 - 14 mmol/L   FrRdHs   Glucose 92 70 - 99 mg/dL   FrRdHs   Urea Nitrogen 9 7 - 30 mg/dL   FrRdHs   Creatinine 0.68 0.52 - 1.04 mg/dL   FrRdHs   GFR Estimate >90 >60 mL/min/{1.73_m2}   FrRdHs   Comment:         Non  GFR Calc  Starting 2018, serum creatinine based estimated GFR (eGFR) will be   calculated using the Chronic Kidney " Disease Epidemiology Collaboration   (CKD-EPI) equation.     GFR Estimate If Black >90 >60 mL/min/{1.73_m2}   FrRdHs   Comment:          GFR Calc  Starting 12/18/2018, serum creatinine based estimated GFR (eGFR) will be   calculated using the Chronic Kidney Disease Epidemiology Collaboration   (CKD-EPI) equation.     Calcium 8.6 8.5 - 10.1 mg/dL   FrRdHs            UA with Microscopic [[id number]] (Abnormal)  Resulted: 01/10/19 2204, Result status: Final result   Ordering provider:  Inge Grubbs,   01/10/19 2124 Resulting lab:  [facility]    Specimen Information    Type Source Collected On   Midstream Urine   01/10/19 2135          Components    Component Value Reference Range Flag Lab   Color Urine Yellow     FrRdHs   Appearance Urine Clear     FrRdHs   Glucose Urine Negative NEG^Negative mg/dL   FrRdHs   Bilirubin Urine Negative NEG^Negative   FrRdHs   Ketones Urine Negative NEG^Negative mg/dL   FrRdHs   Specific Gravity Urine 1.021 1.003 - 1.035   FrRdHs   Blood Urine Small NEG^Negative A FrRdHs   pH Urine 5.0 5.0 - 7.0 pH   FrRdHs   Protein Albumin Urine Negative NEG^Negative mg/dL   FrRdHs   Urobilinogen mg/dL Negative 0.0 - 2.0 mg/dL   FrRdHs   Nitrite Urine Negative NEG^Negative   FrRdHs   Leukocyte Esterase Urine Negative NEG^Negative   FrRdHs   Source Midstream Urine     FrRdHs   WBC Urine 1 0 - 5 /HPF   FrRdHs   RBC Urine 1 0 - 2 /HPF   FrRdHs   Bacteria Urine Few NEG^Negative /HPF A FrRdHs   Squamous Epithelial /HPF Urine 5 0 - 1 /HPF H FrRdHs   Mucous Urine Present NEG^Negative /LPF A FrRdHs            CBC with platelets differential [[id number]]  Resulted: 01/10/19 2147, Result status: Final result   Ordering provider:  Inge Grubbs,   01/10/19 2124 Resulting lab:  [facility]    Specimen Information    Type Source Collected On   Blood   01/10/19 2134          Components    Component Value Reference Range Flag Lab   WBC 6.0 4.0 - 11.0 10e9/L    FrRdHs   RBC Count 4.88 3.8 - 5.2 10e12/L   FrRdHs   Hemoglobin 13.5 11.7 - 15.7 g/dL   FrRdHs   Hematocrit 42.7 35.0 - 47.0 %   FrRdHs   MCV 88 78 - 100 fl   FrRdHs   MCH 27.7 26.5 - 33.0 pg   FrRdHs   MCHC 31.6 31.5 - 36.5 g/dL   FrRdHs   RDW 13.7 10.0 - 15.0 %   FrRdHs   Platelet Count 365 150 - 450 10e9/L   FrRdHs   Diff Method Automated Method     FrRdHs   % Neutrophils 58.3 %   FrRdHs   % Lymphocytes 23.7 %   FrRdHs   % Monocytes 11.4 %   FrRdHs   % Eosinophils 5.5 %   FrRdHs   % Basophils 0.3 %   FrRdHs   % Immature Granulocytes 0.8 %   FrRdHs   Nucleated RBCs 0 0 /100   FrRdHs   Absolute Neutrophil 3.5 1.6 - 8.3 10e9/L   FrRdHs   Absolute Lymphocytes 1.4 0.8 - 5.3 10e9/L   FrRdHs   Absolute Monocytes 0.7 0.0 - 1.3 10e9/L   FrRdHs   Absolute Eosinophils 0.3 0.0 - 0.7 10e9/L   FrRdHs   Absolute Basophils 0.0 0.0 - 0.2 10e9/L   FrRdHs   Abs Immature Granulocytes 0.1 0 - 0.4 10e9/L   FrRdHs   Absolute Nucleated RBC 0.0     FrRdHs            Testing Performed By     Lab - Abbreviation Name Director Address Valid Date Range    12 - FrRdHs [facility] Unknown [address] [facility] [address] 05/08/15 1057 - Present            Unresulted Labs (24h ago, onward)    None         Imaging Results - 3 Days      OB transvaginal US [[id number]]  Resulted: 01/10/19 2325, Result status: Final result   Ordering provider:  Inge Grubbs DO  01/10/19 2234 Resulted by:  Brian Watson MD   Performed:  01/10/19 2248 - 01/10/19 2308 Accession number:  [id number]   Resulting lab:  RADIOLOGY RESULTS   Narrative:  ULTRASOUND OBSTETRIC FIRST TRIMESTER WITH TRANSVAGINAL IMAGING   1/10/2019 11:08 PM     HISTORY: Pregnant. Abdominal pain.    COMPARISON: None.    TECHNIQUE: Endovaginal imaging was also performed to better evaluate  the endometrial cavity.    FINDINGS: The uterus is retroflexed. No myometrial masses. A  gestational sac is present in the endometrial cavity with a mean  diameter of 1.0 cm,  corresponding to an estimated gestational age of 5  weeks 5 days. A yolk sac is present within the gestational sac. No  definite embryo is visualized in the gestational sac. No subchorionic  hemorrhage. The right and left ovaries measure 4.1 x 2.6 x 2.6 cm and  2.2 x 2.1 x 1.1 cm respectively. A 2.7 x 2.1 x 2.0 cm cyst in the  right ovary could represent a corpus luteum cyst. The left ovary is  unremarkable. No adnexal masses. A small amount of simple-appearing  free fluid in the pelvis.     Impression:  IMPRESSION:  1. Single intrauterine pregnancy at 5 weeks 5 days estimated  gestational age based upon mean gestational sac diameter on this  study. A yolk sac is present within an intrauterine gestational sac,  but it is too early to visualize an embryo.  2. Unremarkable appearance of the ovaries.  3. A small amount of nonspecific free fluid in the pelvis.    DAINA GARCIA MD      XR Chest 2 Views [[id number]]  Resulted: 01/10/19 2229, Result status: Final result   Ordering provider:  Inge Grubbs DO  01/10/19 2124 Resulted by:  Rafael Harris MD   Performed:  01/10/19 2154 - 01/10/19 2207 Accession number:  [id number]   Resulting lab:  RADIOLOGY RESULTS   Narrative:  XR CHEST TWO VIEWS   1/10/2019 10:07 PM     HISTORY: Cough.    COMPARISON: Chest x-ray 8/23/2018.     Impression:  IMPRESSION: PA and lateral views of the chest. Lungs are clear. Heart  is normal in size. No effusions are evident. No pneumothorax.    RAFAEL HARRIS MD      POC US OB TRANSABDOMINAL LIMITED [[id number]]  Resulted: 01/10/19 2125, Result status: In process   Ordering provider:  Inge Grubbs DO  01/10/19 2125 Performed:  01/10/19 2125 - 01/10/19 2125   Accession number:  [id number] Resulting lab:  [facility]      Testing Performed By     Lab - Abbreviation Name Director Address Valid Date Range    104 - Rad Rslts RADIOLOGY RESULTS Unknown Unknown 02/16/05 1553 - Present    1735 - [facility] POC[facility] Unknown  Unknown 05/12/15 0832 - Present            Encounter-Level Documents:    There are no encounter-level documents.     Order-Level Documents:    There are no order-level documents.

## 2019-01-10 NOTE — ED AVS SNAPSHOT
[facility] Emergency Department  [address]  [facility] [address]  Phone:  [phone]  Fax:  [phone]                                    Bindu Forte   MRN: [id number]    Department:  [facility] Emergency Department   Date of Visit:  1/10/2019           After Visit Summary Signature Page    I have received my discharge instructions, and my questions have been answered. I have discussed any challenges I see with this plan with the nurse or doctor.    ..........................................................................................................................................  Patient/Patient Representative Signature      ..........................................................................................................................................  Patient Representative Print Name and Relationship to Patient    ..................................................               ................................................  Date                                   Time    ..........................................................................................................................................  Reviewed by Signature/Title    ...................................................              ..............................................  Date                                               Time          22EPIC Rev 08/18

## 2019-01-11 NOTE — ED PROVIDER NOTES
"  History     Chief Complaint:  Multiple Complaints    History limited due to language barrier. History translated by patient's sister.    HPI   Bindu Forte is a 38 year old female () approximately eight weeks pregnant per home pregnancy test who presents to the emergency department today for evaluation of multiple complaints. The patient reports that she developed a cough, congestion, rhinorrhea, fever (103 at highest) and body aches four days ago that has persisted since onset with no relief from tylenol. Additionally, tonight she developed a dull \"achy\" abdominal pain, causing concern for her pregnancy and prompting presentation. Here the patient states that her last period occurred on . She denies any emesis, vaginal bleeding, dysuria, vaginal discharge,diarrhea.  She has known sick contacts.  She has an OBGYN appt this week for formal US and reports taking prenatals.     Allergies:  No Known Drug Allergies     Medications:    Mirena    Past Medical History:    Postpartum depression  Adjustment disorder and anxious mood    Past Surgical History:    Surgical history reviewed. No pertinent surgical history.  Denies abdominal surgery    Family History:    Family history reviewed. No pertinent family history.    Social History:  The patient was accompanied to the ED by her sister.  Smoking Status: Never Smoker  Smokeless Tobacco: Never Used  Alcohol Use: Negative  Drug Use: Negative  Marital Status:        Review of Systems   Constitutional: Positive for fever.        Body aches   HENT: Positive for congestion and rhinorrhea.    Respiratory: Positive for cough.    Gastrointestinal: Positive for abdominal pain. Negative for vomiting.   Genitourinary: Negative for dysuria, vaginal bleeding and vaginal discharge.   All other systems reviewed and are negative.      Physical Exam     Patient Vitals for the past 24 hrs:   BP Temp Temp src Pulse Resp SpO2 Height   01/10/19 2345 -- -- -- -- -- 100 % " "--   01/10/19 2330 120/72 -- -- 80 -- 100 % --   01/10/19 2123 -- -- -- -- -- 100 % --   01/10/19 2115 (!) 140/97 -- -- 93 18 100 % --   01/10/19 2113 -- 99.3  F (37.4  C) Oral -- -- -- --   01/10/19 2112 -- -- -- -- -- -- 1.676 m (5' 6\")     Physical Exam  Nursing note and vitals reviewed.  Constitutional: Well nourished. Resting comfortably.   Eyes: Conjunctiva normal.  Pupils are equal, round, and reactive to light.   ENT: Nose with rhinorrhea. Mucous membranes pink and moist. TM normal. No posterior oropharyngeal erythema.    Neck: Normal range of motion.  CVS: Normal rate, regular rhythm.  Normal heart sounds.  No murmur.  Pulmonary: Lungs clear to auscultation bilaterally. No wheezes/rales/rhonchi.  GI: Abdomen soft. Nontender, nondistended. No rigidity or guarding.    MSK: No calf tenderness or swelling.  Neuro: Alert. Follows simple commands.  Skin: Skin is warm and dry. No rash noted.   Psychiatric: Normal affect.       Emergency Department Course     Imaging:  Radiology findings were communicated with the patient who voiced understanding of the findings.    OB transvaginal US  1. Single intrauterine pregnancy at 5 weeks 5 days estimated  gestational age based upon mean gestational sac diameter on this  study. A yolk sac is present within an intrauterine gestational sac,  but it is too early to visualize an embryo.  2. Unremarkable appearance of the ovaries.  3. A small amount of nonspecific free fluid in the pelvis.  DAINA GARCIA MD  Reading per radiology    XR Chest 2 Views  PA and lateral views of the chest. Lungs are clear. Heart  is normal in size. No effusions are evident. No pneumothorax.  VALERI HARRIS MD  Reading per radiology      Laboratory:  Laboratory findings were communicated with the patient who voiced understanding of the findings.    ISTAT HCG Quantitative Pregnancy POCT: >2000 (H)  UA with Microscopic: Blood small (A), Bacteria few (A), Squamous Epithelial 5 (H), Mucous present (A), o/w " WNL  CBC: WBC 6.0, HGB 13.5,   BMP: WNL (Creatinine 0.68)  HCG Quantitative Pregnancy: 9858 (H)    Of note, patient is Rh positive per chart review.     Interventions:  2135 NS 1000 ml IV    Emergency Department Course:    2104 Nursing notes and vitals reviewed.    2109 I performed an exam of the patient as documented above.     2134 IV was inserted and blood was drawn for laboratory testing, results above.    2135 The patient provided a urine sample here in the emergency department. This was sent for laboratory testing, findings above.    2155 The patient was sent for a chest xray while in the emergency department, results above.     2249 The patient was sent for an OB ultrasound while in the emergency department, results above.     0002 I personally reviewed the laboratory and imaging results with the patient and answered all related questions prior to discharge.    Impression & Plan      Medical Decision Making:  Bindu Forte is a 38 year old female who presents to the emergency department today for evaluation of myriad of complaints. She reports that she is roughly eight weeks pregnant by home pregnancy test. She is nontoxic appearing on arrival. She has a non peritoneal abdomen. Patient underwent an extensive workup during her time in the department. Labs without evidence of infection or significant electrolyte abnormality. UA without gross infection. Chest xray without focal pneumonia. Patient did undergo a formal transvaginal ultrasound which confirmed live IUP roughly five weeks five days. Patient denies any vaginal bleeding or discharge, clinically no indication for formal pelvic exam at this time. She denies any concerns for STIs.  Patient reports that she has an OBGYN appointment in one week. On reevaluation she is tolerating PO without difficulty. Low suspicion for intraabdominal catastrophe at this point in time. Plan for primary care provider follow up in 2-3 days for reevaluation. I  discussed with the patient that I believe her symptoms at this time are secondary to influenza-like illness. No indication for formal antibiotics at this time. All questions addressed. Recommended tylenol for antipyretic and PO hydration.  Patient instructed to return to ED for fever>103, worsening dyspnea, vaginal bleeding or should symptoms worsen or change.     Diagnosis:    ICD-10-CM    1. Cough R05    2. Less than 8 weeks gestation of pregnancy Z3A.01    3. Influenza-like illness R69      Disposition:   The patient is discharged to home.    Discharge Medications:  Acetaminophen 650mg every 6 hours PRN      Scribe Disclosure:  I, Yuliet Adam, am serving as a scribe at 9:03 PM on 1/10/2019 to document services personally performed by Inge Grubbs DO based on my observations and the provider's statements to me.    [facility] EMERGENCY DEPARTMENT       Inge Grubbs DO  01/11/19 1112

## 2019-01-11 NOTE — ED TRIAGE NOTES
In with fever, coughing, headache and bilat ear pain, more on right. 99.3T here.  Yesterday fever of 103T, has not taken any ibuprofen or tylenol today. Non-productive cough.  All symptoms have been present over the past 4 days. 8 weeks pregnant. Also c/o lower abd pain rating 6/10, denies n/v. ABCD's intact; A/O x 4.

## 2019-03-07 ENCOUNTER — TELEPHONE (OUTPATIENT)
Dept: FAMILY MEDICINE | Facility: CLINIC | Age: 39
End: 2019-03-07

## 2019-03-07 NOTE — TELEPHONE ENCOUNTER
Vivian,  calling to schedule OB visit.  Was seen at ER 1/10/19.  Had U/S at ER that showed her as 5w5d.  FLORIDALMA-  Around 9/7/19.  Discussed why has not called prior to now.  Vivian feels may have been insurance issue.  Previously saw Dr. Weiler in [place].  calling to see Dr. Barker.  Advised I would want to get OK from Dr. Barker as is 39 and she did not deliver previous children.  You only have 2 due in September.  Also discussed Dr. Allen.  She is also going to call and see about scheduling with her since I will not have answer til Monday.  Are you OK seeing her?  Please advise.  Call Vivian [phone].  Teri Kimball RN

## 2019-04-03 ENCOUNTER — PRENATAL OFFICE VISIT (OUTPATIENT)
Dept: NURSING | Facility: CLINIC | Age: 39
End: 2019-04-03
Payer: MEDICAID

## 2019-04-03 VITALS
BODY MASS INDEX: 30.31 KG/M2 | WEIGHT: 188.6 LBS | DIASTOLIC BLOOD PRESSURE: 64 MMHG | HEIGHT: 66 IN | SYSTOLIC BLOOD PRESSURE: 102 MMHG

## 2019-04-03 DIAGNOSIS — O09.529 SUPERVISION OF HIGH-RISK PREGNANCY OF ELDERLY MULTIGRAVIDA: ICD-10-CM

## 2019-04-03 DIAGNOSIS — O09.529 SUPERVISION OF HIGH-RISK PREGNANCY OF ELDERLY MULTIGRAVIDA: Primary | ICD-10-CM

## 2019-04-03 LAB
ABO + RH BLD: ABNORMAL
ABO + RH BLD: ABNORMAL
BLD GP AB INVEST PLASRBC-IMP: ABNORMAL
BLD GP AB SCN SERPL QL: ABNORMAL
BLOOD BANK CMNT PATIENT-IMP: ABNORMAL
ERYTHROCYTE [DISTWIDTH] IN BLOOD BY AUTOMATED COUNT: 13.9 % (ref 10–15)
HBV SURFACE AG SERPL QL IA: NONREACTIVE
HCT VFR BLD AUTO: 35.3 % (ref 35–47)
HGB BLD-MCNC: 11.7 G/DL (ref 11.7–15.7)
HIV 1+2 AB+HIV1 P24 AG SERPL QL IA: NONREACTIVE
MCH RBC QN AUTO: 28.3 PG (ref 26.5–33)
MCHC RBC AUTO-ENTMCNC: 33.1 G/DL (ref 31.5–36.5)
MCV RBC AUTO: 86 FL (ref 78–100)
PLATELET # BLD AUTO: 363 10E9/L (ref 150–450)
RBC # BLD AUTO: 4.13 10E12/L (ref 3.8–5.2)
SPECIMEN EXP DATE BLD: ABNORMAL
WBC # BLD AUTO: 8 10E9/L (ref 4–11)

## 2019-04-03 PROCEDURE — 36415 COLL VENOUS BLD VENIPUNCTURE: CPT | Performed by: OBSTETRICS & GYNECOLOGY

## 2019-04-03 PROCEDURE — 86870 RBC ANTIBODY IDENTIFICATION: CPT | Performed by: OBSTETRICS & GYNECOLOGY

## 2019-04-03 PROCEDURE — 0064U ANTB TP TOTAL&RPR IA QUAL: CPT | Performed by: OBSTETRICS & GYNECOLOGY

## 2019-04-03 PROCEDURE — 85027 COMPLETE CBC AUTOMATED: CPT | Performed by: OBSTETRICS & GYNECOLOGY

## 2019-04-03 PROCEDURE — 86762 RUBELLA ANTIBODY: CPT | Performed by: OBSTETRICS & GYNECOLOGY

## 2019-04-03 PROCEDURE — 86850 RBC ANTIBODY SCREEN: CPT | Performed by: OBSTETRICS & GYNECOLOGY

## 2019-04-03 PROCEDURE — 87340 HEPATITIS B SURFACE AG IA: CPT | Performed by: OBSTETRICS & GYNECOLOGY

## 2019-04-03 PROCEDURE — T1013 SIGN LANG/ORAL INTERPRETER: HCPCS | Mod: U3

## 2019-04-03 PROCEDURE — T1013 SIGN LANG/ORAL INTERPRETER: HCPCS | Mod: U3 | Performed by: OBSTETRICS & GYNECOLOGY

## 2019-04-03 PROCEDURE — 87389 HIV-1 AG W/HIV-1&-2 AB AG IA: CPT | Performed by: OBSTETRICS & GYNECOLOGY

## 2019-04-03 PROCEDURE — 86900 BLOOD TYPING SEROLOGIC ABO: CPT | Performed by: OBSTETRICS & GYNECOLOGY

## 2019-04-03 PROCEDURE — 99207 ZZC NO CHARGE NURSE ONLY: CPT

## 2019-04-03 PROCEDURE — 87086 URINE CULTURE/COLONY COUNT: CPT | Performed by: OBSTETRICS & GYNECOLOGY

## 2019-04-03 PROCEDURE — 86901 BLOOD TYPING SEROLOGIC RH(D): CPT | Performed by: OBSTETRICS & GYNECOLOGY

## 2019-04-03 ASSESSMENT — MIFFLIN-ST. JEOR: SCORE: 1537.23

## 2019-04-03 NOTE — PROGRESS NOTES
Pt attended a NPN one on one nurse visit.  Pt is , 17w4d GA based off of U/s.  She is further along based off of her period.  She is scheduled for an U/s after our nurse visit, discussed with tech.  She will perform anatomy scan if pt measures far enough along to do this, otherwise she will just do dating.  Pt has not had any prenatal care besides an early U/s around 5w5d.  Pt's occupation: homemaker  Pt c/o: none  Advised: n/a  Prev hx of : None  Hx of pregnancy complications: none  Hx of delivery complications: she had hemorrhage after one of her deliveries    Lab Results   Component Value Date    PAP NIL 2015       Hx of abnml pap: Yes, no colp done.  Aware she may get pap at next visit or after delivery.    Went over all information as listed in checklist for 6-12 weeks as well as clinic info and signs/sx to call for.     Melissa TSANG R.N.  [facility]

## 2019-04-03 NOTE — LETTER
[facility]  [address] [address]  [phone] (nurse line)      4/3/2019       Re: Bindu REMY [place] Jann                                                                                                                         [address] [facility] [address]      To Whom It May Concern:      Bindu was seen in our clinic today April 3, 2019 for an appointment.  Please feel free to call the clinic if you have any questions.    Sincerely,    Melissa Mendoza RN  OX/GYN Medicine   [facility]

## 2019-04-04 LAB
BACTERIA SPEC CULT: NORMAL
RUBV IGG SERPL IA-ACNC: 38 IU/ML
SPECIMEN SOURCE: NORMAL
T PALLIDUM AB SER QL: NONREACTIVE

## 2019-04-05 ENCOUNTER — PRENATAL OFFICE VISIT (OUTPATIENT)
Dept: OBGYN | Facility: CLINIC | Age: 39
End: 2019-04-05
Payer: MEDICAID

## 2019-04-05 VITALS
DIASTOLIC BLOOD PRESSURE: 72 MMHG | WEIGHT: 188.3 LBS | BODY MASS INDEX: 30.26 KG/M2 | HEIGHT: 66 IN | SYSTOLIC BLOOD PRESSURE: 118 MMHG

## 2019-04-05 DIAGNOSIS — O09.522 ELDERLY MULTIGRAVIDA IN SECOND TRIMESTER: Primary | ICD-10-CM

## 2019-04-05 PROCEDURE — T1013 SIGN LANG/ORAL INTERPRETER: HCPCS | Mod: U3 | Performed by: OBSTETRICS & GYNECOLOGY

## 2019-04-05 PROCEDURE — 87491 CHLMYD TRACH DNA AMP PROBE: CPT | Performed by: OBSTETRICS & GYNECOLOGY

## 2019-04-05 PROCEDURE — 99207 ZZC FIRST OB VISIT: CPT | Performed by: OBSTETRICS & GYNECOLOGY

## 2019-04-05 PROCEDURE — 87591 N.GONORRHOEAE DNA AMP PROB: CPT | Performed by: OBSTETRICS & GYNECOLOGY

## 2019-04-05 ASSESSMENT — MIFFLIN-ST. JEOR: SCORE: 1535.87

## 2019-04-05 NOTE — Clinical Note
Pap smear done on this date: 10/2019 (approximately), by this group: [facility], results were normal.

## 2019-04-05 NOTE — PROGRESS NOTES
New OB Visit  Bindu Forte   2019   17w6d     Subjective: Bindu Forte 39 year old  at 17w6d dated by LMP, early ultrasound here today for initial OB visit. Patient reports No Problems. Denies cramping and vaginal spotting.     Gyn History:   Menses: LMP: Patient's last menstrual period was 2018. frequency: q month  Sexually transmitted disease history: none.    Occupation: homemaker    H/o Chicken Pox or Varicella Vaccination: Yes    Since her last LMP she denies use of alcohol, tobacco and street drugs.    OBhx:  x 4  Obstetric History       T4      L4     SAB0   TAB0   Ectopic0   Multiple0   Live Births4       # Outcome Date GA Lbr Andrew/2nd Weight Sex Delivery Anes PTL Lv   5 Current            4 Term 03/14/15 38w1d 04:19 / 00:37 3.031 kg (6 lb 10.9 oz) M Vag-Spont EPI  BANDAR      Apgar1:  9                Apgar5: 9   3 Term / 39w0d  3.175 kg (7 lb) M Vag-Spont  N BANDAR   2 Term 06/18/04 39w0d  3.289 kg (7 lb 4 oz) F Vag-Spont  N BANDAR   1 Term 03/16/ 39w0d  2.835 kg (6 lb 4 oz) M Vag-Spont None  BANDAR      Complications: Hemorrhage          ROS: Ten point review of systems was reviewed and negative except the above.    HISTORY:  Past Medical History:   Diagnosis Date     Postpartum depression     currently being treated for anxiety and depression denies any problems at present on mreds     No past surgical history on file.  Family History   Problem Relation Age of Onset     Hypertension Mother      Hypertension Father      Heart Disease Father      Social History     Socioeconomic History     Marital status:      Spouse name: None     Number of children: None     Years of education: None     Highest education level: None   Occupational History     Occupation: homemaker   Social Needs     Financial resource strain: None     Food insecurity:     Worry: None     Inability: None     Transportation needs:     Medical: None     Non-medical: None   Tobacco  "Use     Smoking status: Never Smoker     Smokeless tobacco: Never Used   Substance and Sexual Activity     Alcohol use: No     Drug use: No     Sexual activity: Yes     Partners: Male   Lifestyle     Physical activity:     Days per week: None     Minutes per session: None     Stress: None   Relationships     Social connections:     Talks on phone: None     Gets together: None     Attends Baptism service: None     Active member of club or organization: None     Attends meetings of clubs or organizations: None     Relationship status: None     Intimate partner violence:     Fear of current or ex partner: None     Emotionally abused: None     Physically abused: None     Forced sexual activity: None   Other Topics Concern     Parent/sibling w/ CABG, MI or angioplasty before 65F 55M? Not Asked   Social History Narrative     None     Current Outpatient Medications   Medication Sig     Prenatal Vit-Fe Fumarate-FA (PRENATAL MULTIVITAMIN  PLUS IRON) 27-0.8 MG TABS Take 1 tablet by mouth daily     No current facility-administered medications for this visit.      No Known Allergies    Past medical, surgical, social and family history were reviewed and updated in EPIC.      EXAM:  Ht 1.676 m (5' 6\")   Wt 85.4 kg (188 lb 4.8 oz)   LMP 11/09/2018   BMI 30.39 kg/m       Gen:  no acute distress, comfortable  HENT: No scleral injection or icterus  CV: Regular rate and rhythm, no murmur  Resp: CTAB, Normal work of breathing, no cough  GI: Abdomen soft, non-tender. No masses, organomegaly  Skin: No suspicious lesions or rashes  Psychiatric: mentation appears normal and affect bright   Pelvis: External genitalia within normal limits. Urethra is without lesion. Bladder is nontender.    On speculum exam, cervix is without lesion and vagina is normal without lesion or discharge. Pap smear not obtained due to recent normal Pap at [facility]  +[facility] 156      Recent Labs   Lab Test 04/03/19  0904   ABO A   RH Pos   AS Pos*     Rhogam " not indicated     Recent Labs   Lab Test 19  0904 03/05/15  1152   HEPBANG Nonreactive  --    HIAGAB Nonreactive  --    GBS  --  Negative  No GBS DNA detected, presumed negative for GBS or number of bacteria may be   below the limit of detection of the assay.   Assay performed on incubated broth culture of specimen using [employer] real-time   PCR.     RUQIGG 38  --        Treponemal antibody neg    CBC RESULTS:   Recent Labs   Lab Test 19  0904   WBC 8.0   RBC 4.13   HGB 11.7   HCT 35.3   MCV 86   MCH 28.3   MCHC 33.1   RDW 13.9          ASSESSMENT:  39 year old  at 17w6d dated by LMP and early U/S here for NOB visit.    PLAN:    1)Prenatal labs reviewed. She has no questions.  Explained unidentified antibody and need for follow up.  Unlikely to be of clinical significance.  2) EDUCATION : RECOMMENDED WEIGHT GAIN: 25-35 lbs given Body mass index is 30.39 kg/m ..   - Instructed on best evidence for: healthy diet and foods to avoid; exercise and activity during pregnancy; and maintenance of a generally healthy lifestyle. Reviewed early pregnancy education, provider coverage, labor and delivery, and prenatal visits.  Discussed the harms, benefits, side effects and alternative therapies for current prescribed and OTC medications.  - recommend PNV  3) Discussed options for screening for chromosomal anomalies, including first screen, noninvasive prenatal testing, CVS/amniocentesis, quad screen, and ultrasound at 18-20 weeks. She is electing ultrasound at 18-20 weeks in [facility] [place] due to AMA.      Follow up in 4 weeks. She is encouraged to call sooner with questions or concerns.    Jamir Ribeiro MD   Obstetrics and Gynecology

## 2019-04-05 NOTE — NURSING NOTE
"Chief Complaint   Patient presents with     Prenatal Care   17w6d  No concerns, here with     Initial /72   Ht 1.676 m (5' 6\")   Wt 85.4 kg (188 lb 4.8 oz)   LMP 2018   BMI 30.39 kg/m   Estimated body mass index is 30.39 kg/m  as calculated from the following:    Height as of this encounter: 1.676 m (5' 6\").    Weight as of this encounter: 85.4 kg (188 lb 4.8 oz).  BP completed using cuff size: large    Questioned patient about current smoking habits.  Pt. has never smoked.          The following HM Due: NONE      The following patient reported/Care Every where data was sent to:  P ABSTRACT QUALITY INITIATIVES [52916]  Pap smear done on this date: 10/2018 (approximately), by this group: [facility], results were normal.       Mague Hinojosa CMA               "

## 2019-04-07 LAB
C TRACH DNA SPEC QL NAA+PROBE: NEGATIVE
N GONORRHOEA DNA SPEC QL NAA+PROBE: NEGATIVE
SPECIMEN SOURCE: NORMAL
SPECIMEN SOURCE: NORMAL

## 2019-04-16 ENCOUNTER — PRE VISIT (OUTPATIENT)
Dept: MATERNAL FETAL MEDICINE | Facility: CLINIC | Age: 39
End: 2019-04-16

## 2019-04-17 ENCOUNTER — HOSPITAL ENCOUNTER (OUTPATIENT)
Dept: LAB | Facility: CLINIC | Age: 39
End: 2019-04-17
Attending: OBSTETRICS & GYNECOLOGY
Payer: MEDICAID

## 2019-04-17 ENCOUNTER — OFFICE VISIT (OUTPATIENT)
Dept: MATERNAL FETAL MEDICINE | Facility: CLINIC | Age: 39
End: 2019-04-17
Attending: OBSTETRICS & GYNECOLOGY
Payer: MEDICAID

## 2019-04-17 ENCOUNTER — HOSPITAL ENCOUNTER (OUTPATIENT)
Dept: ULTRASOUND IMAGING | Facility: CLINIC | Age: 39
Discharge: HOME | End: 2019-04-17
Attending: OBSTETRICS & GYNECOLOGY | Admitting: OBSTETRICS & GYNECOLOGY
Payer: MEDICAID

## 2019-04-17 DIAGNOSIS — O26.90 PREGNANCY RELATED CONDITION, ANTEPARTUM: ICD-10-CM

## 2019-04-17 DIAGNOSIS — O09.522 ELDERLY MULTIGRAVIDA IN SECOND TRIMESTER: ICD-10-CM

## 2019-04-17 DIAGNOSIS — O09.522 SUPERVISION OF ELDERLY MULTIGRAVIDA IN SECOND TRIMESTER: Primary | ICD-10-CM

## 2019-04-17 DIAGNOSIS — O35.9XX0 SUSPECTED FETAL ANOMALY, ANTEPARTUM, SINGLE OR UNSPECIFIED FETUS: ICD-10-CM

## 2019-04-17 DIAGNOSIS — O09.522 ELDERLY MULTIGRAVIDA IN SECOND TRIMESTER: Primary | ICD-10-CM

## 2019-04-17 PROCEDURE — 40000791 ZZHCL STATISTIC VERIFI PRENATAL TRISOMY 21,18,13: Performed by: OBSTETRICS & GYNECOLOGY

## 2019-04-17 PROCEDURE — 76811 OB US DETAILED SNGL FETUS: CPT

## 2019-04-17 PROCEDURE — 96040 ZZH GENETIC COUNSELING, EACH 30 MINUTES: CPT | Mod: ZF | Performed by: GENETIC COUNSELOR, MS

## 2019-04-17 PROCEDURE — 36415 COLL VENOUS BLD VENIPUNCTURE: CPT | Performed by: OBSTETRICS & GYNECOLOGY

## 2019-04-17 NOTE — PROGRESS NOTES
"Please see \"Imaging\" tab under \"Chart Review\" for details of today's visit.    Lenora Amador    "

## 2019-04-17 NOTE — PROGRESS NOTES
[facility]  Genetic Counseling Consult    Patient:  Bindu Forte YOB: 1980   Date of Service:  19      Bindu Forte was seen at the [facility] for genetic consultation as part of her appointment for comprehensive ultrasound.  The indication for genetic counseling is advanced maternal age. The encounter was conducted with in-person  (Vivian Selby, KTTS# [id number]) due to the patient speaking limited english.        Impression/Plan:   1. Bindu has declined serum screening in this pregnancy.    2. Bindu had a comprehensive (level II) ultrasound today.  Please see the ultrasound report for further details.    3. The patient had a blood draw for NIPT (Innatal test through [facility]).  Results are expected within 7-10 days, and will be available in [facility].  We will contact her to discuss the results, and a copy will be forwarded to the office of the referring OB provider.    Pregnancy History:   /Parity:    Age at Delivery: 39 year old  FLORIDALMA: 2019, by Ultrasound   Gestational Age: 19w4d    No significant complications or exposures were reported in the current pregnancy.    Bindu s pregnancy history is significant for four full term pregnancies, she did experience a hemorrhage after delivery with her first pregnancy, three healthy sons and one healthy daughter    Medical History:   Bindu steel reported medical history is not expected to impact pregnancy management or risks to fetal development.       Family History:   A three-generation pedigree was obtained, and is scanned under the  Media  tab.   The following significant findings were reported by Bindu:    The father of the pregnancy, Hernán, 41, is healthy    Bindu and Rupert have four children together, healthy sons (19,8,4) and healthy daughter (13)    Rupert also has a healthy daughter,21, from a previous  "jan Emery's sister had uterine cancer diagnosed in her 40s. The remaining family history is negative for cancer but Bindu did have limited information.     Otherwise, the reported family history is negative for multiple miscarriages, stillbirths, birth defects, mental retardation, known genetic conditions, and consanguinity.       Carrier Screening:   The patient reports that she and the father of the pregnancy have [place] ancestry:      Cystic fibrosis is an autosomal recessive genetic condition that occurs with increased frequency in individuals of  ancestry and carrier screening for this condition is available.  In addition,  screening in the [facility] includes cystic fibrosis.      Expanded carrier screening for mutations in a large panel of genes associated with autosomal recessive conditions including cystic fibrosis, spinal muscular atrophy, and others, is now available.      Carrier screening was not discussed today.       Risk Assessment for Chromosome Conditions:   We explained that the risk for fetal chromosome abnormalities increases with maternal age. We discussed specific features of common chromosome abnormalities, including Down syndrome, trisomy 13, trisomy 18, and sex chromosome trisomies.      At age 39 at midtrimester, the risk to have a baby with Down syndrome is 1 in 56.     At age 39 at midtrimester, the risk to have a baby with any chromosome abnormality is 1 in 31.       Bindu did not have maternal serum screening earlier in pregnancy.       Testing Options:   We discussed the following options. Bindu said she was possibly interested in screening. She explained she would use this information strictly for planning purposes and would \"still have a baby with Down syndrome\". We discussed the options of screening such as NIPT. We also discussed benefits and limitations of the level II ultrasound including markers for aneuploidy screened for on " "ultrasound. Bindu elected to start with the ultrasound first and if she was \"not satisfied with the results she would choose the blood test\". We discussed the main conditions that NIPT screens for.     Bindu met with Dr. Amador after the ultrasound and wished to do NIPT. As I was not available, Dr. Amador obtained consent for the screening and discussed it again with the patient. Bindu was sent to the laboratory to have her blood drawn.      Non-invasive Prenatal Testing (NIPT)    Maternal plasma cell-free DNA testing; first trimester ultrasound with nuchal translucency and nasal bone assessment is recommended, when appropriate    Screens for fetal trisomy 21, trisomy 13, trisomy 18, and sex chromosome aneuploidy    Cannot screen for open neural tube defects; maternal serum AFP after 15 weeks is recommended     Genetic Amniocentesis    Invasive procedure typically performed in the second trimester by which amniotic fluid is obtained for the purpose of chromosome analysis and/or other prenatal genetic analysis    Diagnostic results; >99% sensitivity for fetal chromosome abnormalities    AFAFP measurement tests for open neural tube defects     Comprehensive (Level II) ultrasound: Detailed ultrasound performed between 18-22 weeks gestation to screen for major birth  defects and markers for aneuploidy.    We reviewed the benefits and limitations of this testing.  Screening tests provide a risk assessment specific to the pregnancy for certain fetal chromosome abnormalities, but cannot definitively diagnose or exclude a fetal chromosome abnormality.  Follow-up genetic counseling and consideration of diagnostic testing is recommended with any abnormal screening result.     Diagnostic tests carry inherent risks- including risk of miscarriage- that require careful consideration.  These tests can detect fetal chromosome abnormalities with greater than 99% certainty.  Results can be compromised by maternal cell " contamination or mosaicism, and are limited by the resolution of cytogenetic G-banding technology.  There is no screening nor diagnostic test that can detect all forms of birth defects or mental disability.    It was a pleasure to be involved with Bindu steel care. Face-to-face time of the meeting was 30 minutes.    Chiara Noble MS, [facility]  Licensed Genetic Counselor   [facility]  [email or web address]   Office: [phone] MF: [phone]  Pager: [phone] Fax: [phone]

## 2019-04-23 ENCOUNTER — TELEPHONE (OUTPATIENT)
Dept: MATERNAL FETAL MEDICINE | Facility: CLINIC | Age: 39
End: 2019-04-23

## 2019-04-23 LAB — LAB SCANNED RESULT: NORMAL

## 2019-05-03 ENCOUNTER — PRENATAL OFFICE VISIT (OUTPATIENT)
Dept: OBGYN | Facility: CLINIC | Age: 39
End: 2019-05-03
Payer: MEDICAID

## 2019-05-03 VITALS — WEIGHT: 181.2 LBS | DIASTOLIC BLOOD PRESSURE: 60 MMHG | SYSTOLIC BLOOD PRESSURE: 116 MMHG | BODY MASS INDEX: 29.25 KG/M2

## 2019-05-03 DIAGNOSIS — O09.522 ELDERLY MULTIGRAVIDA IN SECOND TRIMESTER: Primary | ICD-10-CM

## 2019-05-03 LAB — BLD GP AB SCN SERPL QL: NORMAL

## 2019-05-03 PROCEDURE — 36415 COLL VENOUS BLD VENIPUNCTURE: CPT | Performed by: OBSTETRICS & GYNECOLOGY

## 2019-05-03 PROCEDURE — 86850 RBC ANTIBODY SCREEN: CPT | Performed by: OBSTETRICS & GYNECOLOGY

## 2019-05-03 PROCEDURE — 99207 ZZC PRENATAL VISIT: CPT | Performed by: OBSTETRICS & GYNECOLOGY

## 2019-05-03 PROCEDURE — T1013 SIGN LANG/ORAL INTERPRETER: HCPCS | Mod: U3 | Performed by: OBSTETRICS & GYNECOLOGY

## 2019-05-03 NOTE — NURSING NOTE
"Chief Complaint   Patient presents with     Prenatal Care   21w6d  Here with   Discuss : not feeling fetal movements yet with this pregnancy    Initial /60   Wt 82.2 kg (181 lb 3.2 oz)   LMP 2018   BMI 29.25 kg/m   Estimated body mass index is 29.25 kg/m  as calculated from the following:    Height as of 19: 1.676 m (5' 6\").    Weight as of this encounter: 82.2 kg (181 lb 3.2 oz).  BP completed using cuff size: regular    Questioned patient about current smoking habits.  Pt. has never smoked.          The following HM Due: NONE    Mague Hinojosa CMA               "

## 2019-05-03 NOTE — PROGRESS NOTES
present.    Reviewed [facility] U/S - has follow up scan in 1 week to complete anatomic survey.    Will repeat antibody screen today given presence of unidentified Ab on initial T&S.    Not feeling movement as yet but FHTs present and normal.    RTC 4 weeks

## 2019-05-10 ENCOUNTER — HOSPITAL ENCOUNTER (OUTPATIENT)
Dept: ULTRASOUND IMAGING | Facility: CLINIC | Age: 39
Discharge: HOME | End: 2019-05-10
Attending: OBSTETRICS & GYNECOLOGY | Admitting: OBSTETRICS & GYNECOLOGY
Payer: MEDICAID

## 2019-05-10 ENCOUNTER — OFFICE VISIT (OUTPATIENT)
Dept: MATERNAL FETAL MEDICINE | Facility: CLINIC | Age: 39
End: 2019-05-10
Attending: OBSTETRICS & GYNECOLOGY
Payer: MEDICAID

## 2019-05-10 ENCOUNTER — OFFICE VISIT (OUTPATIENT)
Dept: INTERPRETER SERVICES | Facility: CLINIC | Age: 39
End: 2019-05-10
Payer: MEDICAID

## 2019-05-10 DIAGNOSIS — O35.9XX0 SUSPECTED FETAL ANOMALY, ANTEPARTUM, SINGLE OR UNSPECIFIED FETUS: ICD-10-CM

## 2019-05-10 DIAGNOSIS — O09.522 ELDERLY MULTIGRAVIDA IN SECOND TRIMESTER: ICD-10-CM

## 2019-05-10 DIAGNOSIS — O35.9XX0 SUSPECTED FETAL ANOMALY, ANTEPARTUM, SINGLE OR UNSPECIFIED FETUS: Primary | ICD-10-CM

## 2019-05-10 PROCEDURE — T1013 SIGN LANG/ORAL INTERPRETER: HCPCS | Mod: U3,ZF

## 2019-05-10 PROCEDURE — 76816 OB US FOLLOW-UP PER FETUS: CPT

## 2019-05-10 NOTE — PROGRESS NOTES
"Please see \"Imaging\" tab under \"Chart Review\" for details of today's US at the [facility].    Donny Parker MD  Maternal-Fetal Medicine    "

## 2019-06-04 ENCOUNTER — HOSPITAL ENCOUNTER (EMERGENCY)
Facility: CLINIC | Age: 39
Discharge: HOME | End: 2019-06-04
Attending: PHYSICIAN ASSISTANT | Admitting: PHYSICIAN ASSISTANT
Payer: MEDICAID

## 2019-06-04 VITALS
SYSTOLIC BLOOD PRESSURE: 134 MMHG | HEART RATE: 90 BPM | RESPIRATION RATE: 16 BRPM | DIASTOLIC BLOOD PRESSURE: 84 MMHG | OXYGEN SATURATION: 100 % | TEMPERATURE: 98.3 F

## 2019-06-04 DIAGNOSIS — Z34.93 THIRD TRIMESTER PREGNANCY: ICD-10-CM

## 2019-06-04 DIAGNOSIS — K04.7 DENTAL INFECTION: ICD-10-CM

## 2019-06-04 DIAGNOSIS — R68.84 JAW PAIN: ICD-10-CM

## 2019-06-04 PROCEDURE — 64400 NJX AA&/STRD TRIGEMINAL NRV: CPT

## 2019-06-04 PROCEDURE — 99283 EMERGENCY DEPT VISIT LOW MDM: CPT | Mod: 25

## 2019-06-04 RX ORDER — PENICILLIN V POTASSIUM 500 MG/1
500 TABLET, FILM COATED ORAL 4 TIMES DAILY
Qty: 28 TABLET | Refills: 0 | Status: SHIPPED | OUTPATIENT
Start: 2019-06-04 | End: 2019-06-11

## 2019-06-04 RX ORDER — BUPIVACAINE HYDROCHLORIDE AND EPINEPHRINE 5; 5 MG/ML; UG/ML
INJECTION, SOLUTION PERINEURAL
Status: DISCONTINUED
Start: 2019-06-04 | End: 2019-06-04 | Stop reason: HOSPADM

## 2019-06-04 ASSESSMENT — ENCOUNTER SYMPTOMS: FACIAL SWELLING: 1

## 2019-06-04 NOTE — ED AVS SNAPSHOT
[facility] Emergency Department  [address]  [facility] [address]  Phone:  [phone]  Fax:  [phone]                                    Bindu Forte   MRN: [id number]    Department:  [facility] Emergency Department   Date of Visit:  6/4/2019           After Visit Summary Signature Page    I have received my discharge instructions, and my questions have been answered. I have discussed any challenges I see with this plan with the nurse or doctor.    ..........................................................................................................................................  Patient/Patient Representative Signature      ..........................................................................................................................................  Patient Representative Print Name and Relationship to Patient    ..................................................               ................................................  Date                                   Time    ..........................................................................................................................................  Reviewed by Signature/Title    ...................................................              ..............................................  Date                                               Time          22EPIC Rev 08/18

## 2019-06-05 NOTE — DISCHARGE INSTRUCTIONS
Emergency Dental Care  [email or web address]   [address] [place] [address], [place]   Directions   [phone]    Emergency Dental Services  [email or web address]  Emergency Dental Clinic You Can Rely On. Open 24 Hours. Call Now.  [address], [place]   Directions   [phone]    [facility]  Address: [address], [address], [place] [address]   Phone: [phone]    [facility]  [address] [place], [place] [id number] [phone]  E-mail: [email or web address]  Website      [facility]  ([facility])  [address]  [place], [place]  [id number] [phone]  Website    [facility]  [address] [place], [place] [address] [phone]  Website    [facility]  [address]  [place] [address]  Main: [phone]  Appointments: [phone]  Website    [facility] (Dental cleaning, ?deep cleaning - periodontal therapy? and x-rays)  [address] [place], [place] [id number] [phone]    [facility]  [address] [place], [place]  [id number] [phone]  Website    [facility] (Sliding fee scale dental services)  [address] [place] [address]  [phone]    [facility] (Dental cleaning, ?deep cleaning- periodontal therapy? and x-rays)  [address] [place] [address]  [phone]    [facility] (Multiple locations -- call for details)  [address] [place], [place] [id number] [phone]  Website    [facility]  [address] [place] [address]  [phone]  Website    [facility] [place]  [address] [place] [address]  [phone]  Website    [facility] [place]  [address]  [place] [address] [phone]  Website    [facility]  [address] [place] [address]  [phone]  Website    [facility]  [address] [facility] [address]  [place] [address]  [phone]  Website    [facility]  [address] [place] [address]  [phone]  Website    [facility]  Dr. Domenico Hernández  [facility]  [address] [place] [address]  [phone]  Website    [facility]  [address] [place] [address]  [phone]  Website    [facility]  [address] [place] [address]  [phone]  Website    [facility] (Dental cleaning, ?deep cleaning- periodontal therapy? and x-rays)  [address] [place] [address]  [phone]    [facility]  [address] [place] [address]  [phone]  Website    [facility] (open to everyone)  [address] [address]  [phone]  Website    [facility] (Sliding fee scale dental services and some state medical assistance programs accepted)  [address] [place] [address]  [phone]         [facility]  [address]  [place] [address]  [phone]  Website    [facility]  [address] [place], [place] [address] [phone]  E-mail: [email or web address]  Website     [facility] (Sliding fee scale dental services for all)  [address] [place] [address]  [phone]  Website    [facility] (Dental cleaning, ?deep cleaning- periodontal therapy? and x-rays).  [address] [place], [place] [address] [phone]  Website    [facility]  [address] [place] [address]  [phone]  Website    [facility]  [address] [place]   [address]  [phone]  Website    [facility]  [address] [place] [address]  [phone]  Website    [facility]  [address] [place] [address]  [phone]  Website    [facility]  Dental clinic for children with special needs. Accepts MA (must have diagnosed medical condition, i.e.:  autism, CP, chronic disease or condition)  [address]  [place]  [address]  [phone]  Website    [facility]  [address] [place] [address]  [phone]  Website    [facility]  [address] [place] [address]  [phone]  Website    [facility]  [address] [place]  [address]  [phone]  Website    [facility]  Discount plan available.  Call for details.  [address] [place], [place] [address] [phone]    [facility] (Dental services provided by mobile dental unit)  [facility]  [address] [address]  [phone]    [facility] (Dr. Alan Sawant - specific criteria and medical necessity required)  [facility], 2nd [address], [address]  [address] [place] [address]  [phone]  Website    [facility]  [address] [place], [place] [address]  [phone] (main clinic)  Website  After Hours: Adult emergencies [phone] Pediatric emergencies [phone]     Dental Center  [address] [place] [address]  [phone]  Website    [facility]  [address] [place] [address]  [phone]  Website    Dental Clinics Accepting MA Clients    [facility]    Child and Teen Checkups  [facility]  [phone]    [facility]  [address] [facility] [address]  [place],  [place]  [phone]    [facility]  [address]  [place], [place]  [phone]    [facility]  [address] [facility]  [phone]    [facility]  [address] [place] [facility]  [phone]    [facility]  [address] [facility]  [phone]    [facility]  [address]  [phone]    [facility]  [address]  [phone]    [facility]  [place]  [phone]    [facility]  [address] [facility]  [phone]    [facility]  [address] [facility]  [phone]    [facility]  [address] [place] [address]  [phone]    [facility]  [address] [facility]  [phone]    [facility]  [address] [facility]  [phone]    ADDITIONAL RESOURCES    [facility]  [phone]    [facility]  [phone]    Discharge Instructions  Dental Pain    You have been seen today for a toothache. Your pain may be caused by an exposed nerve, an infection (pulpitis), a root abscess (pocket of pus), or other problems. You will need to see a dentist for a solution to your tooth problem. Emergency Department care is only to help control your problem until you can see a dentist; we cannot provide complete dental care.  Today, we did not find any sign that your toothache was caused by any dangerous or life-threatening condition, but sometimes symptoms develop over time and cannot be found during an emergency visit, so it is very important that you follow up with your dentist.      Generally, every Emergency Department visit should have a follow-up clinic visit with either a primary or a specialty clinic/provider. Please follow-up as instructed by your emergency provider today.    Return to the Emergency Department if:  You develop a new fever over 100.4 F.  You cannot open your  mouth normally, cannot move your tongue well, or cannot swallow.  You have new or increased swelling of your face or neck.  You develop drainage of pus or foul smelling material from around your tooth.  What can I do to help myself?  Take any antibiotic the provider may have prescribed for you today.  Avoid very hot or very cold foods as both can cause pain.  Make an appointment to see a dentist as soon as possible. Dentists are generally not ?on-staff? at hospitals so we cannot ?refer? to you to dentist but we may be able to provide a list of dental clinics to help you.  If you were given a prescription for medicine here today, be sure to read all of the information (including the package insert) that comes with your prescription.  This will include important information about the medicine, its side effects, and any warnings that you need to know about.  The pharmacist who fills the prescription can provide more information and answer questions you may have about the medicine.  If you have questions or concerns that the pharmacist cannot address, please call or return to the Emergency Department.   Remember that you can always come back to the Emergency Department if you are not able to see your regular provider in the amount of time listed above, if you get any new symptoms, or if there is anything that worries you.  [facility]  [phone]    This web site contains many locations and information about what services they provide:    [email or web address]

## 2019-06-05 NOTE — ED PROVIDER NOTES
History     Chief Complaint:  Dental Pain      HPI   Bindu Forte is a 26 week pregnant 39 year old female who presents with dental pain. The patient says that the pain started days ago and has gotten worse since then. She says that the pain is coming from the right side in the back of her mouth. She noted some swelling in the area as well. She endorses the use of Tylenol to no relief, with her last dose being this morning. The patient denies any fever, chills, or any trauma to or infection with her teeth.    Allergies:  No Known Drug Allergies    Medications:    Medications reviewed. No pertinent medications.    Past Medical History:    Postpartum depression  Cardiac murmurs  Intrauterine device in place  High-risk pregnancy    Past Surgical History:    Surgical history reviewed. No pertinent surgical history.     Family History:    Mother: hypertension  Father: hypertension, heart disease    Social History:  The patient was accompanied to the ED by daughter.  Smoking Status: Never Smoker  Smokeless Tobacco: Never Used  Alcohol Use: Negative  Drug Use: Negative  Marital Status:         Review of Systems   HENT: Positive for dental problem and facial swelling.    All other systems reviewed and are negative.      Physical Exam     Patient Vitals for the past 24 hrs:   BP Temp Temp src Pulse Heart Rate Resp SpO2   06/04/19 2031 134/84 98.3  F (36.8  C) Oral 90 90 16 100 %        Physical Exam  General: Alert and interactive. Appears somewhat uncomfortable.   Eyes: The pupils are equal and round. No scleral icterus or conjunctival injection.   ENT: Moist mucous membranes. Multiple missing teeth. Tenderness to palpation of the posterior right molars, teeth 1-2. No obvious decay.  There is no abscess along the gumline. No trismus. Normal voice.   Neck: Trachea is in the midline. No soft tissue swelling.     CV: Regular rate. Extremities well perfused.   Resp: Non-labored, no retractions or accessory  muscle use.     GI: Abdomen is not distended.   MS: Moving all extremities well.   Neuro: Alert and oriented x 3. Non-focal examination.    Psych: Awake. Alert.  Normal affect. Appropriate interactions.    Emergency Department Course   PROCEDURE:  Local Dental Block  LOCATION:  Right upper jaw  ANESTHESIA: Local alveolar block using bupivacaine with epinephrine, total of 1.8 mLs .   PROCEDURE NOTE: Area of maximal pain identified. Lollicaine pop uses for topical anesthetic. Injected along the lateral gumline, adjacent to teeth 1-2. Patient had rapid improvement in pain. Patient tolerated the procedure well, and there were no complications.    Emergency Department Course:    2035 Nursing notes and vitals reviewed.    2056 I performed an exam of the patient as documented above.     2113 Dental exam and block preformed.     2133 I personally answered all related questions prior to discharge.    Impression & Plan      Medical Decision Making:  Bindu Forte is a 39 year old female who presents to the emergency department today for evaluation of dental pain. On examination, no obvious cracked tooth, no abscess detected around the tooth amenable to incision and drainage. Considered cellulitis, cracked tooth, apical abscess, pulpitis. No facial swelling or neck swelling/decrease ROM to suggest significant deep space infection. No indication for CT imaging and patient is pregnant. Dental block procedure performed with good relief of pain. Will provide short prescription for Penicillin and large dental list for follow up. Will continue to use ice and Tylenol for pain.     Diagnosis:      ICD-10-CM    1. Dental infection K04.7    2. Jaw pain R68.84    3. Third trimester pregnancy Z34.93      Disposition:   The patient is discharged to home.    Discharge Medications:     Review of your medicines         START taking      Dose / Directions   penicillin V 500 MG tablet  Commonly known as:  VEETID      Dose:  500 mg  Take  1 tablet (500 mg) by mouth 4 times daily for 7 days  Quantity:  28 tablet  Refills:  0           Where to get your medicines      Some of these will need a paper prescription and others can be bought over the counter. Ask your nurse if you have questions.    Bring a paper prescription for each of these medications    penicillin V 500 MG tablet         Scribe Disclosure:  I, Kali Rey, am serving as a scribe at 9:00 PM on 6/4/2019 to document services personally performed by Rhonda Frias, based on my observations and the provider's statements to me.    [facility] EMERGENCY DEPARTMENT       Rhonda Frias PA-C  06/05/19 1031

## 2019-06-05 NOTE — ED TRIAGE NOTES
Right upper dental pain started 3 days ago, have not seen dentist. Took tylenol in the morning that did not help. Pain getting worst. ABCs intact.

## 2019-06-11 ENCOUNTER — PRENATAL OFFICE VISIT (OUTPATIENT)
Dept: OBGYN | Facility: CLINIC | Age: 39
End: 2019-06-11
Payer: MEDICAID

## 2019-06-11 VITALS
HEIGHT: 66 IN | DIASTOLIC BLOOD PRESSURE: 66 MMHG | WEIGHT: 192.1 LBS | SYSTOLIC BLOOD PRESSURE: 108 MMHG | BODY MASS INDEX: 30.87 KG/M2

## 2019-06-11 DIAGNOSIS — B37.31 YEAST INFECTION OF THE VAGINA: ICD-10-CM

## 2019-06-11 DIAGNOSIS — N94.9 VAGINAL SYMPTOM: ICD-10-CM

## 2019-06-11 DIAGNOSIS — R30.9 PAINFUL URINATION: ICD-10-CM

## 2019-06-11 DIAGNOSIS — O09.522 ELDERLY MULTIGRAVIDA IN SECOND TRIMESTER: Primary | ICD-10-CM

## 2019-06-11 LAB
ALBUMIN UR-MCNC: NEGATIVE MG/DL
APPEARANCE UR: CLEAR
BACTERIA #/AREA URNS HPF: ABNORMAL /HPF
BILIRUB UR QL STRIP: NEGATIVE
COLOR UR AUTO: YELLOW
ERYTHROCYTE [DISTWIDTH] IN BLOOD BY AUTOMATED COUNT: 13.5 % (ref 10–15)
GLUCOSE UR STRIP-MCNC: NEGATIVE MG/DL
HCT VFR BLD AUTO: 36 % (ref 35–47)
HGB BLD-MCNC: 11.8 G/DL (ref 11.7–15.7)
HGB UR QL STRIP: NEGATIVE
KETONES UR STRIP-MCNC: NEGATIVE MG/DL
LEUKOCYTE ESTERASE UR QL STRIP: NEGATIVE
MCH RBC QN AUTO: 28.4 PG (ref 26.5–33)
MCHC RBC AUTO-ENTMCNC: 32.8 G/DL (ref 31.5–36.5)
MCV RBC AUTO: 87 FL (ref 78–100)
NITRATE UR QL: NEGATIVE
NON-SQ EPI CELLS #/AREA URNS LPF: ABNORMAL /LPF
PH UR STRIP: 6.5 PH (ref 5–7)
PLATELET # BLD AUTO: 346 10E9/L (ref 150–450)
RBC # BLD AUTO: 4.15 10E12/L (ref 3.8–5.2)
RBC #/AREA URNS AUTO: ABNORMAL /HPF
SOURCE: ABNORMAL
SP GR UR STRIP: 1.02 (ref 1–1.03)
SPECIMEN SOURCE: ABNORMAL
UROBILINOGEN UR STRIP-ACNC: 0.2 EU/DL (ref 0.2–1)
WBC # BLD AUTO: 7.2 10E9/L (ref 4–11)
WBC #/AREA URNS AUTO: ABNORMAL /HPF
WET PREP SPEC: ABNORMAL

## 2019-06-11 PROCEDURE — 86780 TREPONEMA PALLIDUM: CPT | Performed by: ADVANCED PRACTICE MIDWIFE

## 2019-06-11 PROCEDURE — 81001 URINALYSIS AUTO W/SCOPE: CPT | Performed by: ADVANCED PRACTICE MIDWIFE

## 2019-06-11 PROCEDURE — 82950 GLUCOSE TEST: CPT | Performed by: ADVANCED PRACTICE MIDWIFE

## 2019-06-11 PROCEDURE — 99207 ZZC PRENATAL VISIT: CPT | Performed by: ADVANCED PRACTICE MIDWIFE

## 2019-06-11 PROCEDURE — 36415 COLL VENOUS BLD VENIPUNCTURE: CPT | Performed by: ADVANCED PRACTICE MIDWIFE

## 2019-06-11 PROCEDURE — T1013 SIGN LANG/ORAL INTERPRETER: HCPCS | Mod: U3 | Performed by: ADVANCED PRACTICE MIDWIFE

## 2019-06-11 PROCEDURE — 85027 COMPLETE CBC AUTOMATED: CPT | Performed by: ADVANCED PRACTICE MIDWIFE

## 2019-06-11 PROCEDURE — 87210 SMEAR WET MOUNT SALINE/INK: CPT | Performed by: ADVANCED PRACTICE MIDWIFE

## 2019-06-11 RX ORDER — MICONAZOLE NITRATE 2 %
1 CREAM WITH APPLICATOR VAGINAL AT BEDTIME
Qty: 45 G | Refills: 0 | Status: ON HOLD | OUTPATIENT
Start: 2019-06-11 | End: 2019-08-31

## 2019-06-11 ASSESSMENT — MIFFLIN-ST. JEOR: SCORE: 1553.11

## 2019-06-11 NOTE — NURSING NOTE
"Chief Complaint   Patient presents with     Prenatal Care     27w3d       Initial /66   Ht 1.676 m (5' 6\")   Wt 87.1 kg (192 lb 1.6 oz)   LMP 2018   BMI 31.01 kg/m   Estimated body mass index is 31.01 kg/m  as calculated from the following:    Height as of this encounter: 1.676 m (5' 6\").    Weight as of this encounter: 87.1 kg (192 lb 1.6 oz).  BP completed using cuff size: regular    Questioned patient about current smoking habits.  Pt. has never smoked.          The following HM Due: NONE      Jo Parsons MA    "

## 2019-06-11 NOTE — PROGRESS NOTES
"S: Having dysuria and perineal itching.  Started 1 wk ago. Also reports headaches in AM relieved by tylenol, denies vision changes.   Baby active.  Denies uterine cramping, vaginal bleeding or leaking of fluid. BP normal today.   O: Vitals: LMP 11/09/2018   BMI= There is no height or weight on file to calculate BMI. /66   Ht 1.676 m (5' 6\")   Wt 87.1 kg (192 lb 1.6 oz)   LMP 11/09/2018   BMI 31.01 kg/m      Exam:  Constitutional: healthy, alert and no distress  Respiratory: respirations even and unlabored  Gastrointestinal: Abdomen soft, non-tender. Fundus measures appropriate for gestational age. Fetal heart tones hear without difficulty and within normal limits  : Deferred  Psychiatric: mentation appears normal and affect normal/bright  A: (O09.522) Elderly multigravida in second trimester  (primary encounter diagnosis)  Comment: ordered   Plan: UA with Microscopic reflex to Culture, Glucose         tolerance gest screen 1 hour, CBC with         platelets, Treponema Abs w Reflex to RPR and         Titer        -results pending     (R30.9) Painful urination  Comment: ordered   Plan: UA with Microscopic reflex to Culture        -Neg for UTI     (N94.9) Vaginal symptom  Comment: ordered   Plan: UA with Microscopic reflex to Culture, Wet prep        -+yeast     (B37.3) Yeast infection of the vagina  Comment: ordered  Plan: miconazole (MONISTAT 7 SIMPLY CURE) 2 % cream        7 day treatment, return to clinic if symptoms do not improve       P: GCT/repeat RPR today, handout provided.  Tdap given; reviewed CDC recommendations and partner/family vaccination recommended as well.  Need for Rhogam? No.   Discussed Preeclampsia warning signs, encouraged pt to drink more water   Encouraged patient to call with any questions or concerns.  Return to clinic 2 weeks    Мария Jimenez, DNP, APRN, CNM, IBCLC                                "

## 2019-06-12 LAB — GLUCOSE 1H P 50 G GLC PO SERPL-MCNC: 77 MG/DL (ref 60–129)

## 2019-06-13 LAB — T PALLIDUM AB SER QL: NONREACTIVE

## 2019-06-25 ENCOUNTER — PRENATAL OFFICE VISIT (OUTPATIENT)
Dept: OBGYN | Facility: CLINIC | Age: 39
End: 2019-06-25
Payer: MEDICAID

## 2019-06-25 VITALS
HEIGHT: 66 IN | WEIGHT: 190.7 LBS | SYSTOLIC BLOOD PRESSURE: 120 MMHG | BODY MASS INDEX: 30.65 KG/M2 | DIASTOLIC BLOOD PRESSURE: 56 MMHG

## 2019-06-25 DIAGNOSIS — O09.523 SUPERVISION OF ELDERLY MULTIGRAVIDA (>=35 YEARS OLD AT TIME OF DELIVERY), THIRD TRIMESTER: Primary | ICD-10-CM

## 2019-06-25 DIAGNOSIS — B37.31 YEAST INFECTION OF THE VAGINA: ICD-10-CM

## 2019-06-25 DIAGNOSIS — Z23 NEED FOR TDAP VACCINATION: ICD-10-CM

## 2019-06-25 PROCEDURE — 59425 ANTEPARTUM CARE ONLY: CPT | Performed by: ADVANCED PRACTICE MIDWIFE

## 2019-06-25 PROCEDURE — 36415 COLL VENOUS BLD VENIPUNCTURE: CPT | Performed by: ADVANCED PRACTICE MIDWIFE

## 2019-06-25 PROCEDURE — 82728 ASSAY OF FERRITIN: CPT | Performed by: ADVANCED PRACTICE MIDWIFE

## 2019-06-25 PROCEDURE — 90471 IMMUNIZATION ADMIN: CPT | Performed by: ADVANCED PRACTICE MIDWIFE

## 2019-06-25 PROCEDURE — 83550 IRON BINDING TEST: CPT | Performed by: ADVANCED PRACTICE MIDWIFE

## 2019-06-25 PROCEDURE — 80053 COMPREHEN METABOLIC PANEL: CPT | Performed by: ADVANCED PRACTICE MIDWIFE

## 2019-06-25 PROCEDURE — 90715 TDAP VACCINE 7 YRS/> IM: CPT | Performed by: ADVANCED PRACTICE MIDWIFE

## 2019-06-25 PROCEDURE — T1013 SIGN LANG/ORAL INTERPRETER: HCPCS | Mod: U3 | Performed by: ADVANCED PRACTICE MIDWIFE

## 2019-06-25 PROCEDURE — 99207 ZZC PRENATAL VISIT: CPT | Performed by: ADVANCED PRACTICE MIDWIFE

## 2019-06-25 PROCEDURE — 83540 ASSAY OF IRON: CPT | Performed by: ADVANCED PRACTICE MIDWIFE

## 2019-06-25 RX ORDER — MICONAZOLE NITRATE 2 %
1 CREAM WITH APPLICATOR VAGINAL AT BEDTIME
Qty: 45 G | Refills: 0 | Status: ON HOLD | OUTPATIENT
Start: 2019-06-25 | End: 2019-08-31

## 2019-06-25 ASSESSMENT — MIFFLIN-ST. JEOR: SCORE: 1546.76

## 2019-06-25 NOTE — NURSING NOTE
"Chief Complaint   Patient presents with     Prenatal Care     29w3d       Initial /56   Ht 1.676 m (5' 6\")   Wt 86.5 kg (190 lb 11.2 oz)   LMP 2018   BMI 30.78 kg/m   Estimated body mass index is 30.78 kg/m  as calculated from the following:    Height as of this encounter: 1.676 m (5' 6\").    Weight as of this encounter: 86.5 kg (190 lb 11.2 oz).  BP completed using cuff size: regular    Questioned patient about current smoking habits.  Pt. has never smoked.          The following HM Due: NONE    Jo Parsons MA             "

## 2019-06-25 NOTE — PROGRESS NOTES
"S:  Baby active.  Denies uterine cramping, vaginal bleeding or leaking of fluid. Reporting Left arm and left leg have numbness with long periods of walking other associated symptoms with long walks including dizziness, SOB/ hard to catch her breath and heart fluttering. As symptoms pass vision blurring.  Has been happening the past 2 weeks. Denies vision changes with headaches. Reports symptoms only occur when walking to the park with her children, denies orthostatic hypotension.   O: Vitals: /56   Ht 1.676 m (5' 6\")   Wt 86.5 kg (190 lb 11.2 oz)   LMP 11/09/2018   BMI 30.78 kg/m    BMI= Body mass index is 30.78 kg/m .  Exam:  Constitutional: healthy, alert and no distress  Respiratory: respirations even and unlabored  Gastrointestinal: Abdomen soft, non-tender. Fundus measures appropriate for gestational age. Fetal heart tones heard without difficulty and within normal limits  : Deferred  Psychiatric: mentation appears normal and affect normal/bright  A:   (B37.3) Yeast infection of the vagina  Comment: ordered   Plan: miconazole (MICONAZOLE 7) 2 % cream        -Patient never picked up prescription, was confused about insurance coverage and safety in pregnancy. Reviewed this treatment is safest option for treating yeast infection in pregnancy.   -Reordered prescription as patient is still symptomatic     (O09.523) Supervision of elderly multigravida (>=35 years old at time of delivery), third trimester  Comment: ordered   Plan: TDAP VACCINE (ADACEL), Iron and iron binding         capacity, Comprehensive metabolic panel (BMP +         Alb, Alk Phos, ALT, AST, Total. Bili, TP),         miconazole (MICONAZOLE 7) 2 % cream, Ferritin        -Eval for recent symptoms, likely positional and worsened with activity, continue to monitor symptoms   -unlikely r/t B12 deficiency, pt is not vegetarian, r/o iron deficiency worsening numbness   -SOB likely related to pregnancy cardiovascular/respiratory changes     P: " Passed GCT, Tdap today   Need for Rhogam? No.   Encouraged patient to call with any questions or concerns.  Return to clinic 2 weeks   present for visit     Мария Jimenez, HAYLEY, APRN, CNM, IBCLC

## 2019-06-25 NOTE — LETTER
June 25, 2019    Regarding: Bindu Forte  YOB: 1980  [address] [facility] [address]    To Whom It May Concern:     Routine teeth cleaning, use of Novocaine and dental x-rays with use of lead apron are safe during all stages of pregnancy. Antibiotics that are category B and narcotic pain medications can also be safely used during all stages of pregnancy. Examples of these include, but are not limited to: amoxicillin, penicillin, Norco, Tylenol #3 and Percocet. Root canals are safe during all trimesters of pregnancy.    Please use these guidelines when treating our patients. If you have additional questions or concerns, please call us at [phone].    Sincerely,        Мария Jimenez CNM

## 2019-06-26 LAB
ALBUMIN SERPL-MCNC: 3 G/DL (ref 3.4–5)
ALP SERPL-CCNC: 117 U/L (ref 40–150)
ALT SERPL W P-5'-P-CCNC: 16 U/L (ref 0–50)
ANION GAP SERPL CALCULATED.3IONS-SCNC: 8 MMOL/L (ref 3–14)
AST SERPL W P-5'-P-CCNC: 19 U/L (ref 0–45)
BILIRUB SERPL-MCNC: 0.3 MG/DL (ref 0.2–1.3)
BUN SERPL-MCNC: 7 MG/DL (ref 7–30)
CALCIUM SERPL-MCNC: 9.1 MG/DL (ref 8.5–10.1)
CHLORIDE SERPL-SCNC: 107 MMOL/L (ref 94–109)
CO2 SERPL-SCNC: 23 MMOL/L (ref 20–32)
CREAT SERPL-MCNC: 0.7 MG/DL (ref 0.52–1.04)
FERRITIN SERPL-MCNC: 9 NG/ML (ref 12–150)
GFR SERPL CREATININE-BSD FRML MDRD: >90 ML/MIN/{1.73_M2}
GLUCOSE SERPL-MCNC: 84 MG/DL (ref 70–99)
IRON SATN MFR SERPL: 13 % (ref 15–46)
IRON SERPL-MCNC: 64 UG/DL (ref 35–180)
POTASSIUM SERPL-SCNC: 4.4 MMOL/L (ref 3.4–5.3)
PROT SERPL-MCNC: 7.4 G/DL (ref 6.8–8.8)
SODIUM SERPL-SCNC: 138 MMOL/L (ref 133–144)
TIBC SERPL-MCNC: 493 UG/DL (ref 240–430)

## 2019-07-16 ENCOUNTER — OFFICE VISIT (OUTPATIENT)
Dept: MATERNAL FETAL MEDICINE | Facility: CLINIC | Age: 39
End: 2019-07-16
Attending: OBSTETRICS & GYNECOLOGY
Payer: COMMERCIAL

## 2019-07-16 ENCOUNTER — HOSPITAL ENCOUNTER (OUTPATIENT)
Dept: ULTRASOUND IMAGING | Facility: CLINIC | Age: 39
Discharge: HOME | End: 2019-07-16
Attending: OBSTETRICS & GYNECOLOGY | Admitting: OBSTETRICS & GYNECOLOGY
Payer: COMMERCIAL

## 2019-07-16 DIAGNOSIS — O09.523 MULTIGRAVIDA OF ADVANCED MATERNAL AGE IN THIRD TRIMESTER: Primary | ICD-10-CM

## 2019-07-16 DIAGNOSIS — O09.522 ELDERLY MULTIGRAVIDA IN SECOND TRIMESTER: ICD-10-CM

## 2019-07-16 PROCEDURE — 76816 OB US FOLLOW-UP PER FETUS: CPT

## 2019-07-16 NOTE — PROGRESS NOTES
Please see ultrasound report under imaging tab for details on ultrasound performed today.    Wilma Johnson MD  , OB/GYN  Maternal-Fetal Medicine  [email or web address]  [phone] (Academic office)  [phone] (Pager)

## 2019-07-23 ENCOUNTER — PRENATAL OFFICE VISIT (OUTPATIENT)
Dept: OBGYN | Facility: CLINIC | Age: 39
End: 2019-07-23
Payer: COMMERCIAL

## 2019-07-23 VITALS
WEIGHT: 191.7 LBS | DIASTOLIC BLOOD PRESSURE: 58 MMHG | HEIGHT: 66 IN | SYSTOLIC BLOOD PRESSURE: 115 MMHG | BODY MASS INDEX: 30.81 KG/M2

## 2019-07-23 DIAGNOSIS — N94.9 VAGINAL DISCOMFORT: ICD-10-CM

## 2019-07-23 DIAGNOSIS — O09.523 SUPERVISION OF ELDERLY MULTIGRAVIDA (>=35 YEARS OLD AT TIME OF DELIVERY), THIRD TRIMESTER: Primary | ICD-10-CM

## 2019-07-23 LAB
ERYTHROCYTE [DISTWIDTH] IN BLOOD BY AUTOMATED COUNT: 13.8 % (ref 10–15)
HCT VFR BLD AUTO: 35 % (ref 35–47)
HGB BLD-MCNC: 11.4 G/DL (ref 11.7–15.7)
MCH RBC QN AUTO: 27.6 PG (ref 26.5–33)
MCHC RBC AUTO-ENTMCNC: 32.6 G/DL (ref 31.5–36.5)
MCV RBC AUTO: 85 FL (ref 78–100)
PLATELET # BLD AUTO: 343 10E9/L (ref 150–450)
RBC # BLD AUTO: 4.13 10E12/L (ref 3.8–5.2)
SPECIMEN SOURCE: NORMAL
WBC # BLD AUTO: 8.4 10E9/L (ref 4–11)
WET PREP SPEC: NORMAL

## 2019-07-23 PROCEDURE — 36415 COLL VENOUS BLD VENIPUNCTURE: CPT | Performed by: ADVANCED PRACTICE MIDWIFE

## 2019-07-23 PROCEDURE — 80053 COMPREHEN METABOLIC PANEL: CPT | Performed by: ADVANCED PRACTICE MIDWIFE

## 2019-07-23 PROCEDURE — 99207 ZZC PRENATAL VISIT: CPT | Performed by: ADVANCED PRACTICE MIDWIFE

## 2019-07-23 PROCEDURE — 85027 COMPLETE CBC AUTOMATED: CPT | Performed by: ADVANCED PRACTICE MIDWIFE

## 2019-07-23 PROCEDURE — 87210 SMEAR WET MOUNT SALINE/INK: CPT | Performed by: ADVANCED PRACTICE MIDWIFE

## 2019-07-23 ASSESSMENT — MIFFLIN-ST. JEOR: SCORE: 1551.3

## 2019-07-23 NOTE — NURSING NOTE
"Chief Complaint   Patient presents with     Prenatal Care       Initial /58   Ht 1.676 m (5' 6\")   Wt 87 kg (191 lb 11.2 oz)   LMP 2018   BMI 30.94 kg/m   Estimated body mass index is 30.94 kg/m  as calculated from the following:    Height as of this encounter: 1.676 m (5' 6\").    Weight as of this encounter: 87 kg (191 lb 11.2 oz).  BP completed using cuff size: regular    Questioned patient about current smoking habits.  Pt. has never smoked.          The following HM Due: NONE    Jo Parsons MA    "

## 2019-07-23 NOTE — PATIENT INSTRUCTIONS
When to call   Discuss this with your nurse-midwife.  In general, you should call if you have leaking fluid, bleeding, decreased baby movement or if your contractions are about five minutes apart.  The midwife pager phone number is [phone]. Please call this number.  When you call, you will be prompted to enter the call back number you would like the on call provider to call back.  Please enter a 10 digit phone number. You will hear a few beeps after entering the phone number.  The on-call provider will call you back, perhaps from a blocked phone number. If you do not hear from the on call midwife within 20 minutes, please call our main clinic phone [phone].    The hospital     Your baby will be born at the BirthPlace, which is on the fourth floor of [facility].  Call [phone]) to arrange a tour of the BirthPlace.     Packing your bag    Bring clothing for you and your baby, and anything that may make your birth/stay more comfortable.  It is a good idea to practice installing the car seat before you deliver.  Please do not bring valuables to the hospital.

## 2019-07-23 NOTE — PROGRESS NOTES
"S: Feels well,  Baby active.  Denies  vaginal bleeding or leaking of fluid. Intermittent BH contractions. Intermittent headaches 3 days last week states that x2 had spots in eyes when she closed her eyes, takes tylenol and rest subsides a little. Sleeping well. BP normal today.    O: Vitals: LMP 11/09/2018  /58   Ht 1.676 m (5' 6\")   Wt 87 kg (191 lb 11.2 oz)   LMP 11/09/2018   BMI 30.94 kg/m      BMI= There is no height or weight on file to calculate BMI.  Exam:  Constitutional: healthy, alert and no distress  Respiratory: respirations even and unlabored  Gastrointestinal: Abdomen soft, non-tender. Fundus measures appropriate for gestational age. Fetal heart tones 140 hear without difficulty and within normal limits.  Cephalic per leopold's maneuver.   : Deferred  Psychiatric: mentation appears normal and affect normal/bright  A:     ICD-10-CM    1. Supervision of elderly multigravida (>=35 years old at time of delivery), third trimester O09.523      P: Discussed GBS screen for next visit. Discussed plans for labor, plans on labor epidural.   Draw labs today, reviewed pre-eclampsia warning signs   Desires CNM care.   Encouraged patient to call with any questions or concerns.  Return to clinic 2 weeks     present for visits   Мария Jimenez, HAYLEY, APRN, CNM, IBCLC                "

## 2019-07-24 LAB
ALBUMIN SERPL-MCNC: 2.8 G/DL (ref 3.4–5)
ALP SERPL-CCNC: 136 U/L (ref 40–150)
ALT SERPL W P-5'-P-CCNC: 16 U/L (ref 0–50)
ANION GAP SERPL CALCULATED.3IONS-SCNC: 6 MMOL/L (ref 3–14)
AST SERPL W P-5'-P-CCNC: 19 U/L (ref 0–45)
BILIRUB SERPL-MCNC: 0.2 MG/DL (ref 0.2–1.3)
BUN SERPL-MCNC: 5 MG/DL (ref 7–30)
CALCIUM SERPL-MCNC: 8.5 MG/DL (ref 8.5–10.1)
CHLORIDE SERPL-SCNC: 108 MMOL/L (ref 94–109)
CO2 SERPL-SCNC: 23 MMOL/L (ref 20–32)
CREAT SERPL-MCNC: 0.8 MG/DL (ref 0.52–1.04)
GFR SERPL CREATININE-BSD FRML MDRD: >90 ML/MIN/{1.73_M2}
GLUCOSE SERPL-MCNC: 64 MG/DL (ref 70–99)
POTASSIUM SERPL-SCNC: 3.9 MMOL/L (ref 3.4–5.3)
PROT SERPL-MCNC: 6.9 G/DL (ref 6.8–8.8)
SODIUM SERPL-SCNC: 137 MMOL/L (ref 133–144)

## 2019-08-06 ENCOUNTER — PRENATAL OFFICE VISIT (OUTPATIENT)
Dept: OBGYN | Facility: CLINIC | Age: 39
End: 2019-08-06
Payer: COMMERCIAL

## 2019-08-06 VITALS
BODY MASS INDEX: 30.78 KG/M2 | SYSTOLIC BLOOD PRESSURE: 102 MMHG | DIASTOLIC BLOOD PRESSURE: 60 MMHG | HEIGHT: 66 IN | WEIGHT: 191.5 LBS

## 2019-08-06 DIAGNOSIS — O09.523 SUPERVISION OF ELDERLY MULTIGRAVIDA (>=35 YEARS OLD AT TIME OF DELIVERY), THIRD TRIMESTER: Primary | ICD-10-CM

## 2019-08-06 LAB
ERYTHROCYTE [DISTWIDTH] IN BLOOD BY AUTOMATED COUNT: 14.3 % (ref 10–15)
HCT VFR BLD AUTO: 34.6 % (ref 35–47)
HGB BLD-MCNC: 11.2 G/DL (ref 11.7–15.7)
MCH RBC QN AUTO: 27.2 PG (ref 26.5–33)
MCHC RBC AUTO-ENTMCNC: 32.4 G/DL (ref 31.5–36.5)
MCV RBC AUTO: 84 FL (ref 78–100)
PLATELET # BLD AUTO: 339 10E9/L (ref 150–450)
RBC # BLD AUTO: 4.12 10E12/L (ref 3.8–5.2)
WBC # BLD AUTO: 8.6 10E9/L (ref 4–11)

## 2019-08-06 PROCEDURE — 80053 COMPREHEN METABOLIC PANEL: CPT | Performed by: ADVANCED PRACTICE MIDWIFE

## 2019-08-06 PROCEDURE — 36415 COLL VENOUS BLD VENIPUNCTURE: CPT | Performed by: ADVANCED PRACTICE MIDWIFE

## 2019-08-06 PROCEDURE — 99207 ZZC PRENATAL VISIT: CPT | Performed by: ADVANCED PRACTICE MIDWIFE

## 2019-08-06 PROCEDURE — 85027 COMPLETE CBC AUTOMATED: CPT | Performed by: ADVANCED PRACTICE MIDWIFE

## 2019-08-06 ASSESSMENT — MIFFLIN-ST. JEOR: SCORE: 1550.39

## 2019-08-06 NOTE — PROGRESS NOTES
"S: Feels well,  Baby active.  Denies uterine cramping, vaginal bleeding or leaking of fluid. Reports intermittent headaches almost every other day, sometimes does not respond to tylenol or rest. Also states having vision changes. C/o left arm numbness, this has been ongoing this pregnancy.   O: Vitals: LMP 11/09/2018   /60   Ht 1.676 m (5' 6\")   Wt 86.9 kg (191 lb 8 oz)   LMP 11/09/2018   BMI 30.91 kg/m      BMI= There is no height or weight on file to calculate BMI.  Exam:  Constitutional: healthy, alert and no distress  Respiratory: respirations even and unlabored  Gastrointestinal: Abdomen soft, non-tender. Fundus measures appropriate for gestational age. Fetal heart tones 145 heard without difficulty and within normal limits.  Cephalic per leopold's maneuver.   : Deferred  Psychiatric: mentation appears normal and affect normal/bright  A: (O09.523) Supervision of elderly multigravida (>=35 years old at time of delivery), third trimester  (primary encounter diagnosis)  Comment: ordered   Plan: Comprehensive metabolic panel (BMP + Alb, Alk         Phos, ALT, AST, Total. Bili, TP), CBC with         platelets        -Labs drawn today given risk factors and symptoms, continue to monitor   -Instructed to continue to rest, drink plenty of fluids and tylenol PRN for headaches       P: Discussed GBS screen for next visit. Discussed plans for labor. Discussed patient options for postpartum contraception. Patient is planning tubal ligation. Instructed to meet with OBGYN for consultation.   Encouraged patient to call with any questions or concerns.  Return to clinic 1 weeks   present during entire visit.     Мария Jimenez, DNP, APRN, CNM, IBCLC                  "

## 2019-08-06 NOTE — NURSING NOTE
"Chief Complaint   Patient presents with     Prenatal Care       Initial /60   Ht 1.676 m (5' 6\")   Wt 86.9 kg (191 lb 8 oz)   LMP 2018   BMI 30.91 kg/m   Estimated body mass index is 30.91 kg/m  as calculated from the following:    Height as of this encounter: 1.676 m (5' 6\").    Weight as of this encounter: 86.9 kg (191 lb 8 oz).  BP completed using cuff size: regular    Questioned patient about current smoking habits.  Pt. has never smoked.          The following HM Due: NONE    Jo Parsons MA  \  "

## 2019-08-07 LAB
ALBUMIN SERPL-MCNC: 2.8 G/DL (ref 3.4–5)
ALP SERPL-CCNC: 159 U/L (ref 40–150)
ALT SERPL W P-5'-P-CCNC: 13 U/L (ref 0–50)
ANION GAP SERPL CALCULATED.3IONS-SCNC: 7 MMOL/L (ref 3–14)
AST SERPL W P-5'-P-CCNC: 17 U/L (ref 0–45)
BILIRUB SERPL-MCNC: 0.3 MG/DL (ref 0.2–1.3)
BUN SERPL-MCNC: 6 MG/DL (ref 7–30)
CALCIUM SERPL-MCNC: 8.5 MG/DL (ref 8.5–10.1)
CHLORIDE SERPL-SCNC: 106 MMOL/L (ref 94–109)
CO2 SERPL-SCNC: 24 MMOL/L (ref 20–32)
CREAT SERPL-MCNC: 0.72 MG/DL (ref 0.52–1.04)
GFR SERPL CREATININE-BSD FRML MDRD: >90 ML/MIN/{1.73_M2}
GLUCOSE SERPL-MCNC: 85 MG/DL (ref 70–99)
POTASSIUM SERPL-SCNC: 3.8 MMOL/L (ref 3.4–5.3)
PROT SERPL-MCNC: 7.2 G/DL (ref 6.8–8.8)
SODIUM SERPL-SCNC: 137 MMOL/L (ref 133–144)

## 2019-08-13 ENCOUNTER — PRENATAL OFFICE VISIT (OUTPATIENT)
Dept: OBGYN | Facility: CLINIC | Age: 39
End: 2019-08-13
Payer: COMMERCIAL

## 2019-08-13 ENCOUNTER — OFFICE VISIT (OUTPATIENT)
Dept: MATERNAL FETAL MEDICINE | Facility: CLINIC | Age: 39
End: 2019-08-13
Attending: OBSTETRICS & GYNECOLOGY
Payer: COMMERCIAL

## 2019-08-13 ENCOUNTER — HOSPITAL ENCOUNTER (OUTPATIENT)
Dept: ULTRASOUND IMAGING | Facility: CLINIC | Age: 39
Discharge: HOME | End: 2019-08-13
Attending: OBSTETRICS & GYNECOLOGY | Admitting: OBSTETRICS & GYNECOLOGY
Payer: COMMERCIAL

## 2019-08-13 VITALS
SYSTOLIC BLOOD PRESSURE: 108 MMHG | HEIGHT: 66 IN | WEIGHT: 191.2 LBS | DIASTOLIC BLOOD PRESSURE: 60 MMHG | BODY MASS INDEX: 30.73 KG/M2

## 2019-08-13 DIAGNOSIS — O09.523 MULTIGRAVIDA OF ADVANCED MATERNAL AGE IN THIRD TRIMESTER: Primary | ICD-10-CM

## 2019-08-13 DIAGNOSIS — O09.523 MULTIGRAVIDA OF ADVANCED MATERNAL AGE IN THIRD TRIMESTER: ICD-10-CM

## 2019-08-13 DIAGNOSIS — Z34.93 PRENATAL CARE IN THIRD TRIMESTER: Primary | ICD-10-CM

## 2019-08-13 PROCEDURE — 87653 STREP B DNA AMP PROBE: CPT | Performed by: FAMILY MEDICINE

## 2019-08-13 PROCEDURE — 76816 OB US FOLLOW-UP PER FETUS: CPT

## 2019-08-13 PROCEDURE — 87186 SC STD MICRODIL/AGAR DIL: CPT | Performed by: FAMILY MEDICINE

## 2019-08-13 PROCEDURE — 99207 ZZC PRENATAL VISIT: CPT | Performed by: FAMILY MEDICINE

## 2019-08-13 PROCEDURE — 76819 FETAL BIOPHYS PROFIL W/O NST: CPT | Performed by: OBSTETRICS & GYNECOLOGY

## 2019-08-13 ASSESSMENT — MIFFLIN-ST. JEOR: SCORE: 1549.03

## 2019-08-13 NOTE — NURSING NOTE
See ultrasound report under imaging tab for further details of today's visit.       Tennille TRAVIS #[id number] here with pt for her US.

## 2019-08-13 NOTE — NURSING NOTE
"36w3d    Chief Complaint   Patient presents with     Prenatal Care     GBS due--discuss tubal ligation--consent form signed last week with Мария on 19       Initial /60 (BP Location: Left arm, Patient Position: Sitting, Cuff Size: Adult Regular)   Ht 1.676 m (5' 6\")   Wt 86.7 kg (191 lb 3.2 oz)   LMP 2018   BMI 30.86 kg/m   Estimated body mass index is 30.86 kg/m  as calculated from the following:    Height as of this encounter: 1.676 m (5' 6\").    Weight as of this encounter: 86.7 kg (191 lb 3.2 oz).  BP completed using cuff size: regular    Questioned patient about current smoking habits.  Pt. has never smoked.          The following HM Due: NONE    "

## 2019-08-13 NOTE — PROGRESS NOTES
"CC: Here for routine prenatal visit   39 year old y/o  @ 36w3d with Estimated Date of Delivery: Sep 7, 2019     /60 (BP Location: Left arm, Patient Position: Sitting, Cuff Size: Adult Regular)   Ht 1.676 m (5' 6\")   Wt 86.7 kg (191 lb 3.2 oz)   LMP 2018   BMI 30.86 kg/m    See OB flowsheet  + fetal movement, no contractions, no bleeding, no loss of fluid   Discussed monitoring fetal movement       1) concerns: desires PPTL, papers signed previously  2) Routine: A+ RI AMA   3) Risk factors:   A: AMA: normal NIPT, normal mfm us with normal follow-up growth   If cervix is favorable consider IOL 39-40 weeks    Weekly us BPP until delivery for risk factor of AMA   B: Desires tubal ligation:  Recommend and agree. Discussed PPTL vs delayed laparoscopic approach.  Desires PPTL. Only signed papers 1 week prior.  Recommend to see me 2 weeks after delivery, can be done between 6- 12 post partum   If she delivers prior to 37 weeks, she is a candidate for the tubal ligation PPTL, see PPTL form on bottom right   4) Problem list   Patient Active Problem List   Diagnosis     CARDIOVASCULAR SCREENING; LDL GOAL LESS THAN 160     Adjustment disorder with anxious mood     Undiagnosed cardiac murmurs     High-risk pregnancy     Encounter for supervision of other normal pregnancy, unspecified trimester       4) Return: weekly     [place]    "

## 2019-08-13 NOTE — PROGRESS NOTES
"Please see \"Imaging\" tab under \"Chart Review\" for details of today's US.    Tiffanie Kat, DO    "

## 2019-08-14 LAB
GP B STREP DNA SPEC QL NAA+PROBE: POSITIVE
SPECIMEN SOURCE: ABNORMAL

## 2019-08-18 LAB
BACTERIA SPEC CULT: ABNORMAL
SPECIMEN SOURCE: ABNORMAL

## 2019-08-22 ENCOUNTER — PRENATAL OFFICE VISIT (OUTPATIENT)
Dept: OBGYN | Facility: CLINIC | Age: 39
End: 2019-08-22
Payer: COMMERCIAL

## 2019-08-22 VITALS — DIASTOLIC BLOOD PRESSURE: 62 MMHG | SYSTOLIC BLOOD PRESSURE: 96 MMHG | WEIGHT: 192.8 LBS | BODY MASS INDEX: 31.12 KG/M2

## 2019-08-22 DIAGNOSIS — O09.523 MULTIGRAVIDA OF ADVANCED MATERNAL AGE IN THIRD TRIMESTER: Primary | ICD-10-CM

## 2019-08-22 PROCEDURE — 99207 ZZC PRENATAL VISIT: CPT | Performed by: FAMILY MEDICINE

## 2019-08-22 NOTE — PROGRESS NOTES
CC: Here for routine prenatal visit   39 year old y/o  @ 37w5d with Estimated Date of Delivery: Sep 7, 2019     BP 96/62 (BP Location: Right arm, Cuff Size: Adult Large)   Wt 87.5 kg (192 lb 12.8 oz)   LMP 2018   BMI 31.12 kg/m    See OB flowsheet  + fetal movement, no contractions, no bleeding, no loss of fluid   Discussed monitoring fetal movement     This document serves as a record of the services and decisions personally performed and made by Moira Araujo DO. It was created on her behalf by Suzi Gillis, a trained medical scribe. The creation of this document is based on the provider's statements to the medical scribe.  Suzi Gillis 1:30 PM 2019    1) concerns: Headaches - encouraged pt to drink more water, wants to become doctor patient,   2) Routine: A+ RI; Tdap given;   3) Risk factors:   A: AMA: normal NIPT, normal mfm us with normal follow-up growth              If cervix is favorable consider IOL 39-40 weeks               Weekly us BPP until delivery for risk factor of AMA   B: Desires tubal ligation:  Recommend and agree. Discussed PPTL vs delayed laparoscopic approach.  Desires PPTL. Only signed papers 1 week prior.  Recommend to see me 2 weeks after delivery, can be done between 6- 12 post partum   If she delivers prior to 37 weeks, she is a candidate for the tubal ligation PPTL, see PPTL form on bottom right   4) Problem list   Patient Active Problem List   Diagnosis     CARDIOVASCULAR SCREENING; LDL GOAL LESS THAN 160     Adjustment disorder with anxious mood     Undiagnosed cardiac murmurs     High-risk pregnancy     Encounter for supervision of other normal pregnancy, unspecified trimester       5) Return: in one week     The information in this document, created by the medical scribe for me, accurately reflects the services I personally performed and the decisions made by me. I have reviewed and approved this document for accuracy prior to leaving the patient care  area.  August 22, 2019 1:37 PM    Gayle

## 2019-08-22 NOTE — PATIENT INSTRUCTIONS
"Return en randal semana  Return to clinic:  every 4 weeks till 28 weeks, then every 2 weeks till 36 weeks, then weekly till delivery      Phone numbers [place]:  Day/ night [phone] ask for ob triage  Emergency:  Call labor and delivery:  [phone]    What should I call about??    Contraction every 5 minutes for 1 hour 1 minute long (511), bleeding, loss of fluid, headache that doesn't resolve with tylenol, and decreased fetal movement     Start kick counts @ 26-28 weeks   There is an socoby for this!  It is called \"count the kicks\"  Keep track of movement and discover your normal baby movement pattern   guideline is listed below  Please call if you do not feel the baby move!  We will have you come in for fetal heart rate monitoring:   Perception of at least 10 FMs during 12 hours of normal maternal activity   Perception of least 10 FMs over two hours when the mother is at rest and focused on [facility] Address   [address], [place], [place] [address] [phone]    Dr. Moira Araujo, DO    OB/GYN   [facility]                                                      "

## 2019-08-22 NOTE — NURSING NOTE
"Chief Complaint   Patient presents with     Prenatal Care     37w 5d, c/o dizziness and headaches       Initial BP 96/62 (BP Location: Right arm, Cuff Size: Adult Large)   Wt 87.5 kg (192 lb 12.8 oz)   LMP 2018   BMI 31.12 kg/m   Estimated body mass index is 31.12 kg/m  as calculated from the following:    Height as of 19: 1.676 m (5' 6\").    Weight as of this encounter: 87.5 kg (192 lb 12.8 oz).  BP completed using cuff size: large    Questioned patient about current smoking habits.  Pt. has never smoked.          The following HM Due: NONE  Ruth Pimentel CMA           "

## 2019-08-23 ENCOUNTER — ANCILLARY PROCEDURE (OUTPATIENT)
Dept: ULTRASOUND IMAGING | Facility: CLINIC | Age: 39
End: 2019-08-23
Attending: FAMILY MEDICINE
Payer: COMMERCIAL

## 2019-08-23 DIAGNOSIS — O09.523 MULTIGRAVIDA OF ADVANCED MATERNAL AGE IN THIRD TRIMESTER: ICD-10-CM

## 2019-08-23 DIAGNOSIS — Z34.93 PRENATAL CARE IN THIRD TRIMESTER: ICD-10-CM

## 2019-08-23 PROCEDURE — 76819 FETAL BIOPHYS PROFIL W/O NST: CPT | Performed by: OBSTETRICS & GYNECOLOGY

## 2019-08-28 ENCOUNTER — HOSPITAL ENCOUNTER (INPATIENT)
Facility: CLINIC | Age: 39
LOS: 3 days | Discharge: HOME | End: 2019-08-31
Attending: OBSTETRICS & GYNECOLOGY | Admitting: OBSTETRICS & GYNECOLOGY
Payer: COMMERCIAL

## 2019-08-28 ENCOUNTER — ANCILLARY PROCEDURE (OUTPATIENT)
Dept: ULTRASOUND IMAGING | Facility: CLINIC | Age: 39
End: 2019-08-28
Attending: FAMILY MEDICINE
Payer: COMMERCIAL

## 2019-08-28 ENCOUNTER — PRENATAL OFFICE VISIT (OUTPATIENT)
Dept: OBGYN | Facility: CLINIC | Age: 39
End: 2019-08-28
Payer: COMMERCIAL

## 2019-08-28 VITALS
WEIGHT: 194.3 LBS | DIASTOLIC BLOOD PRESSURE: 72 MMHG | SYSTOLIC BLOOD PRESSURE: 110 MMHG | HEIGHT: 66 IN | BODY MASS INDEX: 31.23 KG/M2

## 2019-08-28 DIAGNOSIS — Z30.2 STERILIZATION: Primary | ICD-10-CM

## 2019-08-28 DIAGNOSIS — O41.00X0 OLIGOHYDRAMNIOS, ANTEPARTUM, SINGLE OR UNSPECIFIED FETUS: Primary | ICD-10-CM

## 2019-08-28 LAB
ABO + RH BLD: NORMAL
ABO + RH BLD: NORMAL
AMPHETAMINES UR QL SCN: NEGATIVE
BLD GP AB SCN SERPL QL: NORMAL
BLD PROD TYP BPU: NORMAL
BLOOD BANK CMNT PATIENT-IMP: NORMAL
CANNABINOIDS UR QL: NEGATIVE
COCAINE UR QL: NEGATIVE
NUM BPU REQUESTED: 2
OPIATES UR QL SCN: NEGATIVE
PCP UR QL SCN: NEGATIVE
SPECIMEN EXP DATE BLD: NORMAL

## 2019-08-28 PROCEDURE — 86850 RBC ANTIBODY SCREEN: CPT | Performed by: OBSTETRICS & GYNECOLOGY

## 2019-08-28 PROCEDURE — 36415 COLL VENOUS BLD VENIPUNCTURE: CPT | Performed by: OBSTETRICS & GYNECOLOGY

## 2019-08-28 PROCEDURE — 76819 FETAL BIOPHYS PROFIL W/O NST: CPT | Performed by: OBSTETRICS & GYNECOLOGY

## 2019-08-28 PROCEDURE — 86922 COMPATIBILITY TEST ANTIGLOB: CPT | Performed by: OBSTETRICS & GYNECOLOGY

## 2019-08-28 PROCEDURE — 59425 ANTEPARTUM CARE ONLY: CPT | Performed by: FAMILY MEDICINE

## 2019-08-28 PROCEDURE — 25000132 ZZH RX MED GY IP 250 OP 250 PS 637: Performed by: OBSTETRICS & GYNECOLOGY

## 2019-08-28 PROCEDURE — 80307 DRUG TEST PRSMV CHEM ANLYZR: CPT | Performed by: OBSTETRICS & GYNECOLOGY

## 2019-08-28 PROCEDURE — 12000000 ZZH R&B MED SURG/OB

## 2019-08-28 PROCEDURE — 25800030 ZZH RX IP 258 OP 636: Performed by: OBSTETRICS & GYNECOLOGY

## 2019-08-28 PROCEDURE — 86901 BLOOD TYPING SEROLOGIC RH(D): CPT | Performed by: OBSTETRICS & GYNECOLOGY

## 2019-08-28 PROCEDURE — 86900 BLOOD TYPING SEROLOGIC ABO: CPT | Performed by: OBSTETRICS & GYNECOLOGY

## 2019-08-28 PROCEDURE — 86780 TREPONEMA PALLIDUM: CPT | Performed by: OBSTETRICS & GYNECOLOGY

## 2019-08-28 PROCEDURE — 25000128 H RX IP 250 OP 636: Performed by: OBSTETRICS & GYNECOLOGY

## 2019-08-28 PROCEDURE — 25000125 ZZHC RX 250: Performed by: OBSTETRICS & GYNECOLOGY

## 2019-08-28 PROCEDURE — 99207 ZZC PRENATAL VISIT: CPT | Performed by: FAMILY MEDICINE

## 2019-08-28 RX ORDER — IBUPROFEN 800 MG/1
800 TABLET, FILM COATED ORAL
Status: COMPLETED | OUTPATIENT
Start: 2019-08-28 | End: 2019-08-29

## 2019-08-28 RX ORDER — METHYLERGONOVINE MALEATE 0.2 MG/ML
200 INJECTION INTRAVENOUS
Status: DISCONTINUED | OUTPATIENT
Start: 2019-08-28 | End: 2019-08-30 | Stop reason: CLARIF

## 2019-08-28 RX ORDER — ONDANSETRON 2 MG/ML
4 INJECTION INTRAMUSCULAR; INTRAVENOUS EVERY 6 HOURS PRN
Status: DISCONTINUED | OUTPATIENT
Start: 2019-08-28 | End: 2019-08-31 | Stop reason: HOSPADM

## 2019-08-28 RX ORDER — OXYCODONE AND ACETAMINOPHEN 5; 325 MG/1; MG/1
1 TABLET ORAL
Status: DISCONTINUED | OUTPATIENT
Start: 2019-08-28 | End: 2019-08-30 | Stop reason: CLARIF

## 2019-08-28 RX ORDER — PENICILLIN G POTASSIUM 5000000 [IU]/1
5 INJECTION, POWDER, FOR SOLUTION INTRAMUSCULAR; INTRAVENOUS ONCE
Status: COMPLETED | OUTPATIENT
Start: 2019-08-28 | End: 2019-08-28

## 2019-08-28 RX ORDER — LIDOCAINE 40 MG/G
CREAM TOPICAL
Status: DISCONTINUED | OUTPATIENT
Start: 2019-08-28 | End: 2019-08-31 | Stop reason: HOSPADM

## 2019-08-28 RX ORDER — OXYTOCIN 10 [USP'U]/ML
10 INJECTION, SOLUTION INTRAMUSCULAR; INTRAVENOUS
Status: DISCONTINUED | OUTPATIENT
Start: 2019-08-28 | End: 2019-08-30 | Stop reason: CLARIF

## 2019-08-28 RX ORDER — OXYTOCIN/0.9 % SODIUM CHLORIDE 30/500 ML
1-24 PLASTIC BAG, INJECTION (ML) INTRAVENOUS CONTINUOUS
Status: DISCONTINUED | OUTPATIENT
Start: 2019-08-28 | End: 2019-08-31 | Stop reason: HOSPADM

## 2019-08-28 RX ORDER — OXYTOCIN/0.9 % SODIUM CHLORIDE 30/500 ML
100-340 PLASTIC BAG, INJECTION (ML) INTRAVENOUS CONTINUOUS PRN
Status: DISCONTINUED | OUTPATIENT
Start: 2019-08-28 | End: 2019-08-30 | Stop reason: CLARIF

## 2019-08-28 RX ORDER — FENTANYL CITRATE 50 UG/ML
50-100 INJECTION, SOLUTION INTRAMUSCULAR; INTRAVENOUS
Status: DISCONTINUED | OUTPATIENT
Start: 2019-08-28 | End: 2019-08-30 | Stop reason: CLARIF

## 2019-08-28 RX ORDER — SODIUM CHLORIDE, SODIUM LACTATE, POTASSIUM CHLORIDE, CALCIUM CHLORIDE 600; 310; 30; 20 MG/100ML; MG/100ML; MG/100ML; MG/100ML
INJECTION, SOLUTION INTRAVENOUS CONTINUOUS
Status: DISCONTINUED | OUTPATIENT
Start: 2019-08-28 | End: 2019-08-30 | Stop reason: CLARIF

## 2019-08-28 RX ORDER — NALOXONE HYDROCHLORIDE 0.4 MG/ML
.1-.4 INJECTION, SOLUTION INTRAMUSCULAR; INTRAVENOUS; SUBCUTANEOUS
Status: DISCONTINUED | OUTPATIENT
Start: 2019-08-28 | End: 2019-08-30 | Stop reason: CLARIF

## 2019-08-28 RX ORDER — CARBOPROST TROMETHAMINE 250 UG/ML
250 INJECTION, SOLUTION INTRAMUSCULAR
Status: DISCONTINUED | OUTPATIENT
Start: 2019-08-28 | End: 2019-08-30 | Stop reason: CLARIF

## 2019-08-28 RX ORDER — ACETAMINOPHEN 325 MG/1
650 TABLET ORAL EVERY 4 HOURS PRN
Status: DISCONTINUED | OUTPATIENT
Start: 2019-08-28 | End: 2019-08-30 | Stop reason: CLARIF

## 2019-08-28 RX ORDER — MISOPROSTOL 100 UG/1
25 TABLET ORAL
Status: DISCONTINUED | OUTPATIENT
Start: 2019-08-28 | End: 2019-08-31 | Stop reason: HOSPADM

## 2019-08-28 RX ADMIN — Medication 1 MILLI-UNITS/MIN: at 21:25

## 2019-08-28 RX ADMIN — Medication 25 MCG: at 18:52

## 2019-08-28 RX ADMIN — Medication 25 MCG: at 16:50

## 2019-08-28 RX ADMIN — SODIUM CHLORIDE, POTASSIUM CHLORIDE, SODIUM LACTATE AND CALCIUM CHLORIDE: 600; 310; 30; 20 INJECTION, SOLUTION INTRAVENOUS at 16:46

## 2019-08-28 RX ADMIN — SODIUM CHLORIDE 2.5 MILLION UNITS: 9 INJECTION, SOLUTION INTRAVENOUS at 21:04

## 2019-08-28 RX ADMIN — PENICILLIN G POTASSIUM 5 MILLION UNITS: 5000000 POWDER, FOR SOLUTION INTRAMUSCULAR; INTRAPLEURAL; INTRATHECAL; INTRAVENOUS at 16:46

## 2019-08-28 ASSESSMENT — MIFFLIN-ST. JEOR: SCORE: 1563.09

## 2019-08-28 NOTE — PLAN OF CARE
Pt arrives to floor for induction of labor for oligo. Monitors applied, assessment completed, and admission information gathered with . Consent received for tox screen for late prenatal care. Will call MD for further orders

## 2019-08-28 NOTE — PROVIDER NOTIFICATION
08/28/19 1852   Provider Notification   Provider Name/Title dr downey   Method of Notification At Bedside   Request Evaluate in Person     MD at bedside. Orders to check cervix in 2 hours and call MD if SVE changed

## 2019-08-28 NOTE — NURSING NOTE
"38w4d    Chief Complaint   Patient presents with     Prenatal Care     had ultrasound today       Initial /72 (BP Location: Left arm, Patient Position: Sitting, Cuff Size: Adult Regular)   Ht 1.676 m (5' 6\")   Wt 88.1 kg (194 lb 4.8 oz)   LMP 2018   BMI 31.36 kg/m   Estimated body mass index is 31.36 kg/m  as calculated from the following:    Height as of this encounter: 1.676 m (5' 6\").    Weight as of this encounter: 88.1 kg (194 lb 4.8 oz).  BP completed using cuff size: regular    Questioned patient about current smoking habits.  Pt. has never smoked.          The following HM Due: NONE    "

## 2019-08-28 NOTE — PROVIDER NOTIFICATION
08/28/19 1610   Provider Notification   Provider Name/Title Dr Ribeiro   Method of Notification At Bedside   Request Evaluate in Person     MD at bedside to discuss POC. We will plan on starting IV and getting PCN for GBS pos status. Orders to check cervix and administer 1-3 doses of PO cytotec. Ok for pt to have regular diet.

## 2019-08-28 NOTE — H&P
"     OB Admit History & Physical  2019    Bindu Forte  [id number]    History of Present Illness:   Bindu Forte  Is a 39 year old female who is  being seen for oligohydramnios and decreased fetal movement.  Her Estimated Date of Delivery is Sep 7, 2019, and gestational age is 38w4d.  Her last menstrual period was Patient's last menstrual period was 2018..     Oligohydramnios was identified on today's BPP and induction of labor advised    OB History:   Estimated body mass index is 31.36 kg/m  as calculated from the following:    Height as of an earlier encounter on 19: 1.676 m (5' 6\").    Weight as of an earlier encounter on 19: 88.1 kg (194 lb 4.8 oz).  OB History    Para Term  AB Living   5 4 4 0 0 4   SAB TAB Ectopic Multiple Live Births   0 0 0 0 4      # Outcome Date GA Lbr Andrew/2nd Weight Sex Delivery Anes PTL Lv   5 Current            4 Term 03/14/15 38w1d 04:19 :37 3.031 kg (6 lb 10.9 oz) M Vag-Spont EPI  BANDAR      Apgar1: 9  Apgar5: 9   3 Term 11 39w0d  3.175 kg (7 lb) M Vag-Spont  N BANDAR   2 Term 18/ 39w0d  3.289 kg (7 lb 4 oz) F Vag-Spont  N BANDAR   1 Term 00 39w0d  2.835 kg (6 lb 4 oz) M Vag-Spont None  BANDAR      Birth Comments: hemorrhage after delivery, pt states she was told it was a blood clot      Complications: Hemorrhage       Her prenatal course has been  complicated by AMA,an unidentified Ab and, now, oligohydramnios.  Estimated fetal weight =  3000gm    Past Medical History:   Past Medical History:   Diagnosis Date     Postpartum depression     currently being treated for anxiety and depression denies any problems at present on mreds     History reviewed. No pertinent surgical history.    Medications:   No current outpatient medications on file.       Allergies:   Blood transfusion related (informational only)          Physical Exam:  Vitals:  Last menstrual period 2018, unknown if currently breastfeeding.   FHT rate " at 150 bpm ,with no ctx noted  GEN: Alert Awake in NAD.  Citizen of [place] speaking with  present  Abdomen gravid NT  Cervix 1.5/60/ at today's clinic appt    Labs:   Hemoglobin   Date Value Ref Range Status   08/06/2019 11.2 (L) 11.7 - 15.7 g/dL Final   07/23/2019 11.4 (L) 11.7 - 15.7 g/dL Final     No results found for: RUBELLAABIGG   Lab Results   Component Value Date    ABO A 04/03/2019           Lab Results   Component Value Date    RH Pos 04/03/2019      GBS +    Assessment and Plan:   Intrauterine pregnancy at 38w4d  complicated by AMA now with oligohydramnios who presents for labor induction.    1. Admit to L&D  2. PCN prophylaxis for GBS  3. Cervical ripening with 1-3 doses of cytotec followed by pitocin induction        Jamir Ribeiro MD   Dept of OB/GYN  August 28, 2019

## 2019-08-28 NOTE — PROGRESS NOTES
"CC: Here for routine prenatal visit   39 year old y/o  @ 38w4d with Estimated Date of Delivery: Sep 7, 2019     /72 (BP Location: Left arm, Patient Position: Sitting, Cuff Size: Adult Regular)   Ht 1.676 m (5' 6\")   Wt 88.1 kg (194 lb 4.8 oz)   LMP 2018   BMI 31.36 kg/m    See OB flowsheet  +decreased fetal movement, no contractions, no bleeding, no loss of fluid   Discussed monitoring fetal movement       1) concerns: decreased fetal movement, us with bpp 8/8 with oligohydramnios  2) Routine:    3) Risk factors:   A: AMA: weekly bpp 36 weeks +, growth us  B: PPBC: desires tubal ligation, consent signed  4) Problem list   Patient Active Problem List   Diagnosis     CARDIOVASCULAR SCREENING; LDL GOAL LESS THAN 160     Adjustment disorder with anxious mood     Undiagnosed cardiac murmurs     High-risk pregnancy     Encounter for supervision of other normal pregnancy, unspecified trimester       4) Return: send to labor and delivery for induction.    Case d/w MFM who recommends delivery due to oligohydramnios  D/w patient through     Gayle    "

## 2019-08-29 ENCOUNTER — ANESTHESIA (OUTPATIENT)
Dept: OBGYN | Facility: CLINIC | Age: 39
End: 2019-08-29
Payer: COMMERCIAL

## 2019-08-29 ENCOUNTER — ANESTHESIA EVENT (OUTPATIENT)
Dept: OBGYN | Facility: CLINIC | Age: 39
End: 2019-08-29
Payer: COMMERCIAL

## 2019-08-29 LAB — T PALLIDUM AB SER QL: NONREACTIVE

## 2019-08-29 PROCEDURE — 27110038 ZZH RX 271: Performed by: ANESTHESIOLOGY

## 2019-08-29 PROCEDURE — 25800030 ZZH RX IP 258 OP 636: Performed by: ANESTHESIOLOGY

## 2019-08-29 PROCEDURE — 72200001 ZZH LABOR CARE VAGINAL DELIVERY SINGLE

## 2019-08-29 PROCEDURE — 40000083 ZZH STATISTIC IP LACTATION SERVICES 1-15 MIN

## 2019-08-29 PROCEDURE — 25000128 H RX IP 250 OP 636: Performed by: OBSTETRICS & GYNECOLOGY

## 2019-08-29 PROCEDURE — 37000011 ZZH ANESTHESIA WARD SERVICE

## 2019-08-29 PROCEDURE — 25000128 H RX IP 250 OP 636: Performed by: ANESTHESIOLOGY

## 2019-08-29 PROCEDURE — 25800030 ZZH RX IP 258 OP 636: Performed by: OBSTETRICS & GYNECOLOGY

## 2019-08-29 PROCEDURE — 3E0R3BZ INTRODUCTION OF ANESTHETIC AGENT INTO SPINAL CANAL, PERCUTANEOUS APPROACH: ICD-10-PCS | Performed by: ANESTHESIOLOGY

## 2019-08-29 PROCEDURE — 10907ZC DRAINAGE OF AMNIOTIC FLUID, THERAPEUTIC FROM PRODUCTS OF CONCEPTION, VIA NATURAL OR ARTIFICIAL OPENING: ICD-10-PCS | Performed by: OBSTETRICS & GYNECOLOGY

## 2019-08-29 PROCEDURE — 40000671 ZZH STATISTIC ANESTHESIA CASE

## 2019-08-29 PROCEDURE — 12000000 ZZH R&B MED SURG/OB

## 2019-08-29 PROCEDURE — 25000125 ZZHC RX 250: Performed by: ANESTHESIOLOGY

## 2019-08-29 PROCEDURE — 00HU33Z INSERTION OF INFUSION DEVICE INTO SPINAL CANAL, PERCUTANEOUS APPROACH: ICD-10-PCS | Performed by: ANESTHESIOLOGY

## 2019-08-29 PROCEDURE — 59410 OBSTETRICAL CARE: CPT | Performed by: OBSTETRICS & GYNECOLOGY

## 2019-08-29 PROCEDURE — 25000132 ZZH RX MED GY IP 250 OP 250 PS 637: Performed by: OBSTETRICS & GYNECOLOGY

## 2019-08-29 RX ORDER — AMOXICILLIN 250 MG
2 CAPSULE ORAL 2 TIMES DAILY
Status: DISCONTINUED | OUTPATIENT
Start: 2019-08-29 | End: 2019-08-31 | Stop reason: HOSPADM

## 2019-08-29 RX ORDER — EPHEDRINE SULFATE 50 MG/ML
5 INJECTION, SOLUTION INTRAMUSCULAR; INTRAVENOUS; SUBCUTANEOUS
Status: DISCONTINUED | OUTPATIENT
Start: 2019-08-29 | End: 2019-08-29 | Stop reason: HOSPADM

## 2019-08-29 RX ORDER — BISACODYL 10 MG
10 SUPPOSITORY, RECTAL RECTAL DAILY PRN
Status: DISCONTINUED | OUTPATIENT
Start: 2019-08-31 | End: 2019-08-31 | Stop reason: HOSPADM

## 2019-08-29 RX ORDER — LIDOCAINE HYDROCHLORIDE AND EPINEPHRINE 15; 5 MG/ML; UG/ML
INJECTION, SOLUTION EPIDURAL PRN
Status: DISCONTINUED | OUTPATIENT
Start: 2019-08-29 | End: 2019-08-29

## 2019-08-29 RX ORDER — OXYTOCIN 10 [USP'U]/ML
10 INJECTION, SOLUTION INTRAMUSCULAR; INTRAVENOUS
Status: DISCONTINUED | OUTPATIENT
Start: 2019-08-29 | End: 2019-08-31 | Stop reason: HOSPADM

## 2019-08-29 RX ORDER — NALOXONE HYDROCHLORIDE 0.4 MG/ML
.1-.4 INJECTION, SOLUTION INTRAMUSCULAR; INTRAVENOUS; SUBCUTANEOUS
Status: DISCONTINUED | OUTPATIENT
Start: 2019-08-29 | End: 2019-08-31 | Stop reason: HOSPADM

## 2019-08-29 RX ORDER — BUPIVACAINE HCL/0.9 % NACL/PF 0.125 %
PLASTIC BAG, INJECTION (ML) EPIDURAL
Status: DISCONTINUED
Start: 2019-08-29 | End: 2019-08-29 | Stop reason: HOSPADM

## 2019-08-29 RX ORDER — IBUPROFEN 800 MG/1
800 TABLET, FILM COATED ORAL EVERY 6 HOURS PRN
Status: DISCONTINUED | OUTPATIENT
Start: 2019-08-29 | End: 2019-08-31 | Stop reason: HOSPADM

## 2019-08-29 RX ORDER — OXYTOCIN/0.9 % SODIUM CHLORIDE 30/500 ML
340 PLASTIC BAG, INJECTION (ML) INTRAVENOUS CONTINUOUS PRN
Status: DISCONTINUED | OUTPATIENT
Start: 2019-08-29 | End: 2019-08-31 | Stop reason: HOSPADM

## 2019-08-29 RX ORDER — HYDROCORTISONE 2.5 %
CREAM (GRAM) TOPICAL 3 TIMES DAILY PRN
Status: DISCONTINUED | OUTPATIENT
Start: 2019-08-29 | End: 2019-08-31 | Stop reason: HOSPADM

## 2019-08-29 RX ORDER — NALOXONE HYDROCHLORIDE 0.4 MG/ML
.1-.4 INJECTION, SOLUTION INTRAMUSCULAR; INTRAVENOUS; SUBCUTANEOUS
Status: DISCONTINUED | OUTPATIENT
Start: 2019-08-29 | End: 2019-08-29 | Stop reason: HOSPADM

## 2019-08-29 RX ORDER — NALBUPHINE HYDROCHLORIDE 10 MG/ML
2.5-5 INJECTION, SOLUTION INTRAMUSCULAR; INTRAVENOUS; SUBCUTANEOUS EVERY 6 HOURS PRN
Status: DISCONTINUED | OUTPATIENT
Start: 2019-08-29 | End: 2019-08-29 | Stop reason: HOSPADM

## 2019-08-29 RX ORDER — EPHEDRINE SULFATE 50 MG/ML
INJECTION, SOLUTION INTRAMUSCULAR; INTRAVENOUS; SUBCUTANEOUS
Status: DISCONTINUED
Start: 2019-08-29 | End: 2019-08-29 | Stop reason: HOSPADM

## 2019-08-29 RX ORDER — AMOXICILLIN 250 MG
1 CAPSULE ORAL 2 TIMES DAILY
Status: DISCONTINUED | OUTPATIENT
Start: 2019-08-29 | End: 2019-08-31 | Stop reason: HOSPADM

## 2019-08-29 RX ORDER — LANOLIN 100 %
OINTMENT (GRAM) TOPICAL
Status: DISCONTINUED | OUTPATIENT
Start: 2019-08-29 | End: 2019-08-31 | Stop reason: HOSPADM

## 2019-08-29 RX ORDER — BUPIVACAINE HYDROCHLORIDE 2.5 MG/ML
INJECTION, SOLUTION EPIDURAL; INFILTRATION; INTRACAUDAL PRN
Status: DISCONTINUED | OUTPATIENT
Start: 2019-08-29 | End: 2019-08-29

## 2019-08-29 RX ORDER — OXYTOCIN/0.9 % SODIUM CHLORIDE 30/500 ML
100 PLASTIC BAG, INJECTION (ML) INTRAVENOUS CONTINUOUS
Status: DISCONTINUED | OUTPATIENT
Start: 2019-08-29 | End: 2019-08-31 | Stop reason: HOSPADM

## 2019-08-29 RX ORDER — BUPIVACAINE HCL/0.9 % NACL/PF 0.125 %
PLASTIC BAG, INJECTION (ML) EPIDURAL CONTINUOUS
Status: DISCONTINUED | OUTPATIENT
Start: 2019-08-29 | End: 2019-08-29 | Stop reason: HOSPADM

## 2019-08-29 RX ORDER — ACETAMINOPHEN 325 MG/1
650 TABLET ORAL EVERY 4 HOURS PRN
Status: DISCONTINUED | OUTPATIENT
Start: 2019-08-29 | End: 2019-08-30

## 2019-08-29 RX ADMIN — LIDOCAINE HYDROCHLORIDE,EPINEPHRINE BITARTRATE 3 ML: 15; .005 INJECTION, SOLUTION EPIDURAL; INFILTRATION; INTRACAUDAL; PERINEURAL at 01:30

## 2019-08-29 RX ADMIN — IBUPROFEN 800 MG: 800 TABLET ORAL at 20:52

## 2019-08-29 RX ADMIN — Medication: at 01:41

## 2019-08-29 RX ADMIN — IBUPROFEN 800 MG: 800 TABLET ORAL at 06:09

## 2019-08-29 RX ADMIN — SODIUM CHLORIDE 2.5 MILLION UNITS: 9 INJECTION, SOLUTION INTRAVENOUS at 02:15

## 2019-08-29 RX ADMIN — SENNOSIDES AND DOCUSATE SODIUM 1 TABLET: 8.6; 5 TABLET ORAL at 12:44

## 2019-08-29 RX ADMIN — IBUPROFEN 800 MG: 800 TABLET ORAL at 12:44

## 2019-08-29 RX ADMIN — BUPIVACAINE HYDROCHLORIDE 5 ML: 2.5 INJECTION, SOLUTION EPIDURAL; INFILTRATION; INTRACAUDAL at 01:33

## 2019-08-29 RX ADMIN — BUPIVACAINE HYDROCHLORIDE 5 ML: 2.5 INJECTION, SOLUTION EPIDURAL; INFILTRATION; INTRACAUDAL at 01:36

## 2019-08-29 RX ADMIN — SENNOSIDES AND DOCUSATE SODIUM 1 TABLET: 8.6; 5 TABLET ORAL at 20:53

## 2019-08-29 NOTE — ANESTHESIA PROCEDURE NOTES
Peripheral nerve/Neuraxial procedure note :  Pre-Procedure  Performed by Bhavik Bowman MD  Referred by SABAL  Location: OB      Pre-Anesthestic Checklist: patient identified, IV checked, site marked, risks and benefits discussed, informed consent, monitors and equipment checked, pre-op evaluation, at physician/surgeon's request and post-op pain management    Timeout  Correct Patient: Yes   Correct Procedure: Yes   Correct Site: Yes   Correct Laterality: Yes   Correct Position: Yes   Site Marked: Yes   .       Assessment/Narrative  .  .  . Comments:  Patient desires Labor Epidural for labor analgesia. Vaginal delivery anticipated.    Chart reviewed. Patient examined. No changes to pre procedure chart review. Risks including but not limited to bleeding, infection, nerve injury, PDPH, intrathecal injection, high block, incomplete block, one-sided block, back pain, and low blood pressure discussed in detail. Questions answered. Consent signed.    Pause for the Cause completed. NIBP and pulse ox functioning. L&D nurse present.    Procedure: Sitting. Betadine prep x 3. Sterile drape applied.  Lidocaine 1% x 2 cc local infiltration at L 3-4.  17 G. Tu needle ML DANIELA 1 attempt.  No CSF, paresthesia or blood. 20 g. Epidural catheter inserted w/o resistance 5 cm.  Negative aspiration for CSF and blood. Filter in line.  Test dose Lidocaine 1.5% w/ 1:200,000 epi x 3 cc injected. Negative for neuro change or symptoms of intravascular injection.  Bolus dose: Marcaine 0.25% 5cc x 2 doses (10 cc total).  Infusion orders written.    I or my partner am immediately available. I or my partner will monitor the patient and supervise nursing care at necessary intervals.    Nicole

## 2019-08-29 NOTE — PROGRESS NOTES
[facility] Labor and Delivery Progress Note    Bindu Forte MRN# [id number]   Age: 39 year old YOB: 1980      present      Subjective:   Feeling cramps           Objective:     Patient Vitals for the past 24 hrs:   BP Temp Temp src Resp Oximeter Heart Rate   19 1851 107/65 98  F (36.7  C) -- 16 76 bpm   19 1651 122/68 99.2  F (37.3  C) Oral 16 69 bpm         Cervical Exam: 3 / 60% / -2      Position: Mid    Membranes: Ruptured      Fetal Heart Rate:    Monitor: external US    Variability: moderate (amplitude range 6 to 25 bpm)    Baseline Rate: normal range    Fetal Heart Rate Tracing: Category 1          Assessment:   Bindu Forte is a 39 year old  who is 38w4d here with oligohydramnios          Plan:   Pitocin augmentation as cervix now favorable  Monitor progress      Jamir Ribeiro

## 2019-08-29 NOTE — PLAN OF CARE
Data: Bindu Forte transferred to postpartum via wheelchair at 0700. Baby transferred via parent's arms.  Action: Receiving unit notified of transfer: Yes. Patient and family notified of room change. Report given to oncoming RN. Belongings sent to receiving unit. Accompanied by Registered Nurse. Oriented patient to surroundings. Call light within reach. ID bands double-checked with receiving RN.  Response: Patient tolerated transfer and is stable.   Syncope  Introduction  Syncope is when you lose temporarily pass out (faint). Signs that you may be about to pass out include:  · Feeling dizzy or light-headed.  · Feeling sick to your stomach (nauseous).  · Seeing all white or all black.  · Having cold, clammy skin.  If you passed out, get help right away. Call your local emergency services (871 in the [place] Do not drive yourself to the hospital.  Follow these instructions at home:  Pay attention to any changes in your symptoms. Take these actions to help with your condition:  · Have someone stay with you until you feel stable.  · Do not drive, use machinery, or play sports until your doctor says it is okay.  · Keep all follow-up visits as told by your doctor. This is important.  · If you start to feel like you might pass out, lie down right away and raise (elevate) your feet above the level of your heart. Breathe deeply and steadily. Wait until all of the symptoms are gone.  · Drink enough fluid to keep your pee (urine) clear or pale yellow.  · If you are taking blood pressure or heart medicine, get up slowly and spend many minutes getting ready to sit and then stand. This can help with dizziness.  · Take over-the-counter and prescription medicines only as told by your doctor.  Get help right away if:  · You have a very bad headache.  · You have unusual pain in your chest, tummy, or back.  · You are bleeding from your mouth or rectum.  · You have black or tarry poop (stool).  · You have a very fast or uneven heartbeat (palpitations).  · It hurts to breathe.  · You pass out once or more than once.  · You have jerky movements that you cannot control (seizure).  · You are confused.  · You have trouble walking.  · You are very weak.  · You have vision problems.  These symptoms may be an emergency. Do not wait to see if the symptoms will go away. Get medical help right away. Call your local emergency services (261 in the [place] Do not drive yourself to the hospital.    This information is not intended to replace advice given to you by your health care provider. Make sure you discuss any questions you have with your health care provider.  Document Released: 06/05/2009 Document Revised: 05/25/2017 Document Reviewed: 08/31/2016  © 2017 Elsevier

## 2019-08-29 NOTE — PLAN OF CARE
Vital signs stable on room air. Pt still experiencing some effects of the epidural in the right leg and is getting up to the bathroom with the eva steady. Tolerating regular diet. Family was at bedside and supportive but left so the pt could get some rest. Bonding well with baby. Breastfeeding well with minimal assistance from staff. Pain adequately controled with ibuprofen. Fundus firm and midline with light bleeding. Voiding spontaneously.

## 2019-08-29 NOTE — ANESTHESIA PREPROCEDURE EVALUATION
Anesthesia Pre-Procedure Evaluation    Patient: Bindu Forte   MRN: [id number] : 1980          Preoperative Diagnosis: * No surgery found *        Past Medical History:   Diagnosis Date     Postpartum depression     currently being treated for anxiety and depression denies any problems at present on mreds     History reviewed. No pertinent surgical history.  Anesthesia Evaluation       history and physical reviewed .      No history of anesthetic complications          ROS/MED HX    ENT/Pulmonary:  - neg pulmonary ROS     Neurologic:  - neg neurologic ROS     Cardiovascular:  - neg cardiovascular ROS       METS/Exercise Tolerance:     Hematologic:         Musculoskeletal:         GI/Hepatic:     (+) GERD       Renal/Genitourinary:         Endo:         Psychiatric:         Infectious Disease:         Malignancy:         Other:                     neg OB ROS            Physical Exam      Airway   Mallampati: II  TM distance: > 3 FB  Neck ROM: full  Mouth opening: > 3 cm    Dental     Cardiovascular   (+) murmur       Pulmonary     Other findings: Lab Test        19                       1726          1755          1909          WBC          8.6          8.4          7.2           HGB          11.2*        11.4*        11.8          MCV          84           85           87            PLT          339          343          346            Lab Test        19                       1726          1755          1800          NA           137          137          138           POTASSIUM    3.8          3.9          4.4           CHLORIDE     106          108          107           CO2          24           23           23            BUN          6*           5*           7             CR           0.72         0.80         0.70          ANIONGAP     7            6            8             ARPITA          8.5          8.5          9.1           GLC    "       85           64*          84                  Lab Results   Component Value Date    WBC 8.6 08/06/2019    HGB 11.2 (L) 08/06/2019    HCT 34.6 (L) 08/06/2019     08/06/2019     08/06/2019    POTASSIUM 3.8 08/06/2019    CHLORIDE 106 08/06/2019    CO2 24 08/06/2019    BUN 6 (L) 08/06/2019    CR 0.72 08/06/2019    GLC 85 08/06/2019    ARPITA 8.5 08/06/2019    MAG 1.9 09/19/2014    ALBUMIN 2.8 (L) 08/06/2019    PROTTOTAL 7.2 08/06/2019    ALT 13 08/06/2019    AST 17 08/06/2019    ALKPHOS 159 (H) 08/06/2019    BILITOTAL 0.3 08/06/2019    TSH 2.85 01/23/2015    HCGS Negative 03/15/2007       Preop Vitals  BP Readings from Last 3 Encounters:   08/28/19 125/72   08/28/19 110/72   08/22/19 96/62    Pulse Readings from Last 3 Encounters:   06/04/19 90   01/10/19 80   08/23/18 82      Resp Readings from Last 3 Encounters:   08/28/19 18   06/04/19 16   01/10/19 18    SpO2 Readings from Last 3 Encounters:   06/04/19 100%   01/10/19 100%   08/23/18 98%      Temp Readings from Last 1 Encounters:   08/29/19 98  F (36.7  C) (Oral)    Ht Readings from Last 1 Encounters:   08/28/19 1.676 m (5' 6\")      Wt Readings from Last 1 Encounters:   08/28/19 88.1 kg (194 lb 4.8 oz)    Estimated body mass index is 31.36 kg/m  as calculated from the following:    Height as of an earlier encounter on 8/28/19: 1.676 m (5' 6\").    Weight as of an earlier encounter on 8/28/19: 88.1 kg (194 lb 4.8 oz).       Anesthesia Plan      History & Physical Review      ASA Status:  .  OB Epidural Asa: 2            Postoperative Care      Consents  Anesthetic plan, risks, benefits and alternatives discussed with:  Patient..                 Bhavik Bowman MD                    .  "

## 2019-08-29 NOTE — PROVIDER NOTIFICATION
08/28/19 2059   Provider Notification   Provider Name/Title Dr. Ribeiro   Method of Notification At Bedside   Request Evaluate in Person   Notification Reason SVE;Labor Status;Uterine Activity;Membrane Status   . Gema at bedside for SVE and AROM. SVE 3/60/-2, AROM @ 2100, scant fluid clear. MD will place Pitocin induction orders.

## 2019-08-29 NOTE — LACTATION NOTE
Lactation visit with .  Mom reports baby is nursing well without problem or questions. This is her 5th baby to nurse and all went well. Encouraged Mom to call us PRN

## 2019-08-29 NOTE — L&D DELIVERY NOTE
Bindu Forte is a 39 year old-year-old  female,  5 para 4 with LMP 18 and EDC 19, who was admitted for medical induction of labor at 38 weeks 4d gestation.    Her prenatal care was at the [facility] in [place]. Prenatal course was complicated by AMA, an unidentified Ab and recent onset of oligohydramnios. Vaginal Group B Streptococcus culture was positive.  BPP on  with oligohydramnios prompting admission and induction.  She received 2 doses of cytotec.  Patient underwent artificial rupture of membranes at 2100, yielding scant fluid.  Pitocin augmentation was then initiated  Her estimated fetal weight was 3000gm.  Patient received an epidural injection for pain relief.  The patient achieved complete dilation at 55068. She went on to deliver a female infant at 0504 by . Apgars were  9 at one minute and 9 at five minutes. The fetal oropharynx was bulb suctioned on the perineum.  There was no nuchal cord. The placenta delivered spontaneously and intact at 0507.  The patient had no lacerations  QBL for the delivery was 204cc.  Duration of the first stage of labor was 3hr and 13 minutes, second stage 21 minutes, and third stage 3 minutes.  The patient has elected to Breastfeed her infant.  Dr. Arden Ribeiro

## 2019-08-29 NOTE — PROVIDER NOTIFICATION
08/29/19 0120   Provider Notification   Provider Name/Title Dr. Bowman   Method of Notification At Bedside   Request Evaluate in Person   Notification Reason Pain   Dr. Bowman at bedside for epidural placement

## 2019-08-30 ENCOUNTER — OFFICE VISIT (OUTPATIENT)
Dept: INTERPRETER SERVICES | Facility: CLINIC | Age: 39
End: 2019-08-30
Payer: COMMERCIAL

## 2019-08-30 ENCOUNTER — ANESTHESIA EVENT (OUTPATIENT)
Dept: SURGERY | Facility: CLINIC | Age: 39
End: 2019-08-30
Payer: COMMERCIAL

## 2019-08-30 ENCOUNTER — ANESTHESIA (OUTPATIENT)
Dept: SURGERY | Facility: CLINIC | Age: 39
End: 2019-08-30
Payer: COMMERCIAL

## 2019-08-30 PROBLEM — Z30.2 STERILIZATION: Status: ACTIVE | Noted: 2019-08-30

## 2019-08-30 LAB — HGB BLD-MCNC: 10.9 G/DL (ref 11.7–15.7)

## 2019-08-30 PROCEDURE — 25800030 ZZH RX IP 258 OP 636

## 2019-08-30 PROCEDURE — 25000125 ZZHC RX 250: Performed by: NURSE ANESTHETIST, CERTIFIED REGISTERED

## 2019-08-30 PROCEDURE — 40000306 ZZH STATISTIC PRE PROC ASSESS II: Performed by: FAMILY MEDICINE

## 2019-08-30 PROCEDURE — 25000132 ZZH RX MED GY IP 250 OP 250 PS 637: Performed by: FAMILY MEDICINE

## 2019-08-30 PROCEDURE — 88302 TISSUE EXAM BY PATHOLOGIST: CPT | Performed by: FAMILY MEDICINE

## 2019-08-30 PROCEDURE — 85018 HEMOGLOBIN: CPT | Performed by: ANESTHESIOLOGY

## 2019-08-30 PROCEDURE — 36415 COLL VENOUS BLD VENIPUNCTURE: CPT | Performed by: ANESTHESIOLOGY

## 2019-08-30 PROCEDURE — 36000050 ZZH SURGERY LEVEL 2 1ST 30 MIN: Performed by: FAMILY MEDICINE

## 2019-08-30 PROCEDURE — 25000128 H RX IP 250 OP 636: Performed by: FAMILY MEDICINE

## 2019-08-30 PROCEDURE — 71000012 ZZH RECOVERY PHASE 1 LEVEL 1 FIRST HR: Performed by: FAMILY MEDICINE

## 2019-08-30 PROCEDURE — 88302 TISSUE EXAM BY PATHOLOGIST: CPT | Mod: 26 | Performed by: FAMILY MEDICINE

## 2019-08-30 PROCEDURE — T1013 SIGN LANG/ORAL INTERPRETER: HCPCS | Mod: U3

## 2019-08-30 PROCEDURE — 37000009 ZZH ANESTHESIA TECHNICAL FEE, EACH ADDTL 15 MIN: Performed by: FAMILY MEDICINE

## 2019-08-30 PROCEDURE — 37000008 ZZH ANESTHESIA TECHNICAL FEE, 1ST 30 MIN: Performed by: FAMILY MEDICINE

## 2019-08-30 PROCEDURE — 25000128 H RX IP 250 OP 636

## 2019-08-30 PROCEDURE — 0UT70ZZ RESECTION OF BILATERAL FALLOPIAN TUBES, OPEN APPROACH: ICD-10-PCS | Performed by: FAMILY MEDICINE

## 2019-08-30 PROCEDURE — 25800030 ZZH RX IP 258 OP 636: Performed by: FAMILY MEDICINE

## 2019-08-30 PROCEDURE — 25000128 H RX IP 250 OP 636: Performed by: NURSE ANESTHETIST, CERTIFIED REGISTERED

## 2019-08-30 PROCEDURE — 25000128 H RX IP 250 OP 636: Performed by: OBSTETRICS & GYNECOLOGY

## 2019-08-30 PROCEDURE — 27210794 ZZH OR GENERAL SUPPLY STERILE: Performed by: FAMILY MEDICINE

## 2019-08-30 PROCEDURE — 12000000 ZZH R&B MED SURG/OB

## 2019-08-30 PROCEDURE — 25000132 ZZH RX MED GY IP 250 OP 250 PS 637: Performed by: OBSTETRICS & GYNECOLOGY

## 2019-08-30 PROCEDURE — 25800030 ZZH RX IP 258 OP 636: Performed by: ANESTHESIOLOGY

## 2019-08-30 PROCEDURE — 58605 DIVISION OF FALLOPIAN TUBE: CPT | Performed by: FAMILY MEDICINE

## 2019-08-30 PROCEDURE — 36000052 ZZH SURGERY LEVEL 2 EA 15 ADDTL MIN: Performed by: FAMILY MEDICINE

## 2019-08-30 RX ORDER — FENTANYL CITRATE 50 UG/ML
INJECTION, SOLUTION INTRAMUSCULAR; INTRAVENOUS PRN
Status: DISCONTINUED | OUTPATIENT
Start: 2019-08-30 | End: 2019-08-30

## 2019-08-30 RX ORDER — ONDANSETRON 4 MG/1
4 TABLET, ORALLY DISINTEGRATING ORAL EVERY 30 MIN PRN
Status: DISCONTINUED | OUTPATIENT
Start: 2019-08-30 | End: 2019-08-30 | Stop reason: HOSPADM

## 2019-08-30 RX ORDER — PROPOFOL 10 MG/ML
INJECTION, EMULSION INTRAVENOUS PRN
Status: DISCONTINUED | OUTPATIENT
Start: 2019-08-30 | End: 2019-08-30

## 2019-08-30 RX ORDER — HYDROCODONE BITARTRATE AND ACETAMINOPHEN 5; 325 MG/1; MG/1
1 TABLET ORAL EVERY 6 HOURS PRN
Status: DISCONTINUED | OUTPATIENT
Start: 2019-08-30 | End: 2019-08-31 | Stop reason: HOSPADM

## 2019-08-30 RX ORDER — CEFAZOLIN SODIUM 2 G/100ML
INJECTION, SOLUTION INTRAVENOUS PRN
Status: DISCONTINUED | OUTPATIENT
Start: 2019-08-30 | End: 2019-08-30

## 2019-08-30 RX ORDER — FENTANYL CITRATE 50 UG/ML
25-50 INJECTION, SOLUTION INTRAMUSCULAR; INTRAVENOUS
Status: DISCONTINUED | OUTPATIENT
Start: 2019-08-30 | End: 2019-08-30 | Stop reason: HOSPADM

## 2019-08-30 RX ORDER — HYDROMORPHONE HYDROCHLORIDE 1 MG/ML
.3-.5 INJECTION, SOLUTION INTRAMUSCULAR; INTRAVENOUS; SUBCUTANEOUS EVERY 10 MIN PRN
Status: DISCONTINUED | OUTPATIENT
Start: 2019-08-30 | End: 2019-08-30 | Stop reason: HOSPADM

## 2019-08-30 RX ORDER — NALOXONE HYDROCHLORIDE 0.4 MG/ML
.1-.4 INJECTION, SOLUTION INTRAMUSCULAR; INTRAVENOUS; SUBCUTANEOUS
Status: DISCONTINUED | OUTPATIENT
Start: 2019-08-30 | End: 2019-08-30 | Stop reason: HOSPADM

## 2019-08-30 RX ORDER — METOPROLOL TARTRATE 1 MG/ML
1-2 INJECTION, SOLUTION INTRAVENOUS EVERY 5 MIN PRN
Status: DISCONTINUED | OUTPATIENT
Start: 2019-08-30 | End: 2019-08-30 | Stop reason: HOSPADM

## 2019-08-30 RX ORDER — CITRIC ACID/SODIUM CITRATE 334-500MG
30 SOLUTION, ORAL ORAL ONCE
Status: COMPLETED | OUTPATIENT
Start: 2019-08-30 | End: 2019-08-30

## 2019-08-30 RX ORDER — DEXAMETHASONE SODIUM PHOSPHATE 4 MG/ML
INJECTION, SOLUTION INTRA-ARTICULAR; INTRALESIONAL; INTRAMUSCULAR; INTRAVENOUS; SOFT TISSUE PRN
Status: DISCONTINUED | OUTPATIENT
Start: 2019-08-30 | End: 2019-08-30

## 2019-08-30 RX ORDER — MEPERIDINE HYDROCHLORIDE 50 MG/ML
12.5 INJECTION INTRAMUSCULAR; INTRAVENOUS; SUBCUTANEOUS
Status: DISCONTINUED | OUTPATIENT
Start: 2019-08-30 | End: 2019-08-30 | Stop reason: HOSPADM

## 2019-08-30 RX ORDER — ACETAMINOPHEN 325 MG/1
975 TABLET ORAL ONCE
Status: COMPLETED | OUTPATIENT
Start: 2019-08-30 | End: 2019-08-30

## 2019-08-30 RX ORDER — SODIUM CHLORIDE, SODIUM LACTATE, POTASSIUM CHLORIDE, CALCIUM CHLORIDE 600; 310; 30; 20 MG/100ML; MG/100ML; MG/100ML; MG/100ML
INJECTION, SOLUTION INTRAVENOUS CONTINUOUS
Status: DISCONTINUED | OUTPATIENT
Start: 2019-08-30 | End: 2019-08-30 | Stop reason: HOSPADM

## 2019-08-30 RX ORDER — GLYCOPYRROLATE 0.2 MG/ML
INJECTION, SOLUTION INTRAMUSCULAR; INTRAVENOUS PRN
Status: DISCONTINUED | OUTPATIENT
Start: 2019-08-30 | End: 2019-08-30

## 2019-08-30 RX ORDER — NEOSTIGMINE METHYLSULFATE 1 MG/ML
VIAL (ML) INJECTION PRN
Status: DISCONTINUED | OUTPATIENT
Start: 2019-08-30 | End: 2019-08-30

## 2019-08-30 RX ORDER — DIMENHYDRINATE 50 MG/ML
25 INJECTION, SOLUTION INTRAMUSCULAR; INTRAVENOUS
Status: DISCONTINUED | OUTPATIENT
Start: 2019-08-30 | End: 2019-08-30 | Stop reason: HOSPADM

## 2019-08-30 RX ORDER — BUPIVACAINE HYDROCHLORIDE 2.5 MG/ML
INJECTION, SOLUTION INFILTRATION; PERINEURAL PRN
Status: DISCONTINUED | OUTPATIENT
Start: 2019-08-30 | End: 2019-08-30 | Stop reason: HOSPADM

## 2019-08-30 RX ORDER — LIDOCAINE 40 MG/G
CREAM TOPICAL
Status: DISCONTINUED | OUTPATIENT
Start: 2019-08-30 | End: 2019-08-30 | Stop reason: HOSPADM

## 2019-08-30 RX ORDER — LIDOCAINE 40 MG/G
CREAM TOPICAL
Status: DISCONTINUED | OUTPATIENT
Start: 2019-08-30 | End: 2019-08-31

## 2019-08-30 RX ORDER — ALBUTEROL SULFATE 0.83 MG/ML
2.5 SOLUTION RESPIRATORY (INHALATION) EVERY 4 HOURS PRN
Status: DISCONTINUED | OUTPATIENT
Start: 2019-08-30 | End: 2019-08-30 | Stop reason: HOSPADM

## 2019-08-30 RX ORDER — ONDANSETRON 2 MG/ML
4 INJECTION INTRAMUSCULAR; INTRAVENOUS EVERY 30 MIN PRN
Status: DISCONTINUED | OUTPATIENT
Start: 2019-08-30 | End: 2019-08-30 | Stop reason: HOSPADM

## 2019-08-30 RX ORDER — LIDOCAINE HYDROCHLORIDE 10 MG/ML
INJECTION, SOLUTION INFILTRATION; PERINEURAL PRN
Status: DISCONTINUED | OUTPATIENT
Start: 2019-08-30 | End: 2019-08-30

## 2019-08-30 RX ADMIN — SODIUM CITRATE AND CITRIC ACID MONOHYDRATE 30 ML: 500; 334 SOLUTION ORAL at 11:43

## 2019-08-30 RX ADMIN — ACETAMINOPHEN 975 MG: 325 TABLET, FILM COATED ORAL at 11:44

## 2019-08-30 RX ADMIN — PROPOFOL 150 MG: 10 INJECTION, EMULSION INTRAVENOUS at 12:05

## 2019-08-30 RX ADMIN — SENNOSIDES AND DOCUSATE SODIUM 2 TABLET: 8.6; 5 TABLET ORAL at 20:37

## 2019-08-30 RX ADMIN — ACETAMINOPHEN 650 MG: 325 TABLET ORAL at 01:25

## 2019-08-30 RX ADMIN — GLYCOPYRROLATE 0.5 MG: 0.2 INJECTION, SOLUTION INTRAMUSCULAR; INTRAVENOUS at 12:44

## 2019-08-30 RX ADMIN — MIDAZOLAM 2 MG: 1 INJECTION INTRAMUSCULAR; INTRAVENOUS at 12:04

## 2019-08-30 RX ADMIN — SENNOSIDES AND DOCUSATE SODIUM 1 TABLET: 8.6; 5 TABLET ORAL at 09:09

## 2019-08-30 RX ADMIN — SODIUM CHLORIDE, POTASSIUM CHLORIDE, SODIUM LACTATE AND CALCIUM CHLORIDE 1000 ML: 600; 310; 30; 20 INJECTION, SOLUTION INTRAVENOUS at 13:52

## 2019-08-30 RX ADMIN — Medication 3 MG: at 12:44

## 2019-08-30 RX ADMIN — DEXAMETHASONE SODIUM PHOSPHATE 4 MG: 4 INJECTION, SOLUTION INTRA-ARTICULAR; INTRALESIONAL; INTRAMUSCULAR; INTRAVENOUS; SOFT TISSUE at 12:22

## 2019-08-30 RX ADMIN — ONDANSETRON 4 MG: 2 INJECTION INTRAMUSCULAR; INTRAVENOUS at 12:22

## 2019-08-30 RX ADMIN — SODIUM CHLORIDE, POTASSIUM CHLORIDE, SODIUM LACTATE AND CALCIUM CHLORIDE: 600; 310; 30; 20 INJECTION, SOLUTION INTRAVENOUS at 11:10

## 2019-08-30 RX ADMIN — PHENYLEPHRINE HYDROCHLORIDE 50 MCG: 10 INJECTION INTRAVENOUS at 12:28

## 2019-08-30 RX ADMIN — IBUPROFEN 800 MG: 800 TABLET ORAL at 09:09

## 2019-08-30 RX ADMIN — GLYCOPYRROLATE 0.2 MG: 0.2 INJECTION, SOLUTION INTRAMUSCULAR; INTRAVENOUS at 12:04

## 2019-08-30 RX ADMIN — ROCURONIUM BROMIDE 30 MG: 10 INJECTION INTRAVENOUS at 12:06

## 2019-08-30 RX ADMIN — CEFAZOLIN SODIUM 2 G: 2 INJECTION, SOLUTION INTRAVENOUS at 12:08

## 2019-08-30 RX ADMIN — SODIUM CHLORIDE, POTASSIUM CHLORIDE, SODIUM LACTATE AND CALCIUM CHLORIDE: 600; 310; 30; 20 INJECTION, SOLUTION INTRAVENOUS at 13:10

## 2019-08-30 RX ADMIN — FENTANYL CITRATE 100 MCG: 50 INJECTION, SOLUTION INTRAMUSCULAR; INTRAVENOUS at 12:03

## 2019-08-30 RX ADMIN — LIDOCAINE HYDROCHLORIDE 30 MG: 10 INJECTION, SOLUTION INFILTRATION; PERINEURAL at 12:03

## 2019-08-30 RX ADMIN — IBUPROFEN 800 MG: 800 TABLET ORAL at 20:02

## 2019-08-30 NOTE — PLAN OF CARE
Patient meeting expected outcomes, VSS. Bonding well with infant (Bhumika).  Fundus firm, midline; Lochia rubra, scant.   Feeding: Breast feeding infant well with no assistance needed from staff.    Mobility: Strong/steady when up but dizzy, requires standby assist to toilet.  Patients mobililty level scored using the bedside mobility assistance tool (BMAT). Patient is at a mobility level test number: 3. Mobility equipment used: none required. Required assist of  1 standby staff members. Further use of BMAT scoring required.  I&O: Regular diet, voiding.   Pain: Cramping uterine pain with breastfeeding; managed with tylenol/ibuprofen.   IV: Saline locked, flushes.

## 2019-08-30 NOTE — ANESTHESIA POSTPROCEDURE EVALUATION
Patient: Bindu Forte    Procedure(s):  BILATERAL SALPINGECTOMY, POSTPARTUM TUBAL LIGATION    Diagnosis:.  Diagnosis Additional Information: No value filed.    Anesthesia Type:  General, ETT    Note:  Anesthesia Post Evaluation    Patient location during evaluation: PACU  Patient participation: Able to fully participate in evaluation  Level of consciousness: awake  Pain management: adequate  Airway patency: patent  Cardiovascular status: acceptable  Respiratory status: acceptable  Hydration status: acceptable  PONV: controlled     Anesthetic complications: None          Last vitals:  Vitals:    08/30/19 1406 08/30/19 1424 08/30/19 1438   BP: 106/71 103/64 106/68   Pulse:      Resp: 14 16 16   Temp:      SpO2: 100% 98% 98%         Electronically Signed By: Tamir Patiño DO  August 30, 2019  2:46 PM

## 2019-08-30 NOTE — PROGRESS NOTES
[facility]   Post-Partum Progress Note          Assessment and Plan:    Assessment:   Post-partum day #1  Normal spontaneous vaginal delivery  Desires PPTL   L&D complications: None      Doing well.      Plan:   Ambulation encouraged  Anticipate discharge tomorrow  Tubal ligation @ 1230, patient  To stay NPO           Interval History:   Doing well.  Pain is well-controlled.  No fevers.  No history of foul-smelling vaginal discharge.  Good appetite.  Denies chest pain, shortness of breath, nausea or vomiting.  Vaginal bleeding is similar to a heavy menstrual flow.  Ambulatory.  Breastfeeding well.          Significant Problems:    None          Review of Systems:    CONSTITUTIONAL: NEGATIVE for fever, chills, change in weight  INTEGUMENTARY/SKIN: NEGATIVE for worrisome rashes, moles or lesions  EYES: NEGATIVE for vision changes or irritation  ENT/MOUTH: NEGATIVE for ear, mouth and throat problems  RESP: NEGATIVE for significant cough or SOB  BREAST: NEGATIVE for masses, tenderness or discharge  CV: NEGATIVE for chest pain, palpitations or peripheral edema  GI: NEGATIVE for nausea, abdominal pain, heartburn, or change in bowel habits  : NEGATIVE for frequency, dysuria, or hematuria  MUSCULOSKELETAL: NEGATIVE for significant arthralgias or myalgia  NEURO: NEGATIVE for weakness, dizziness or paresthesias  ENDOCRINE: NEGATIVE for temperature intolerance, skin/hair changes  HEME: NEGATIVE for bleeding problems  PSYCHIATRIC: NEGATIVE for changes in mood or affect          Medications:   All medications related to the patient's surgery have been reviewed  -          Physical Exam:   All vitals stable  Temp: 98.1  F (36.7  C) Temp src: Oral BP: 122/67 Pulse: 68   Resp: 16        Uterine fundus is firm, non-tender and at the level of the umbilicus          Data:     All laboratory data related to this surgery reviewed  Hemoglobin   Date Value Ref Range Status   08/06/2019 11.2 (L) 11.7 - 15.7 g/dL Final    07/23/2019 11.4 (L) 11.7 - 15.7 g/dL Final   06/11/2019 11.8 11.7 - 15.7 g/dL Final   04/03/2019 11.7 11.7 - 15.7 g/dL Final   01/10/2019 13.5 11.7 - 15.7 g/dL Final     -    Moira Araujo DO

## 2019-08-30 NOTE — OP NOTE
Pre-operative diagnosis: 38 y/o   with multiparity desires permanent sterilization.   Post-operative diagnosis: 38 y/o   with multiparity desires permanent sterilization.   Procedure: Post partum tubal ligation via bilateral salpingectomy   Surgeon: Dr. Moira Araujo   EBL: < 5 cc   Complications: None   Indications: 38 y/o   with multiparity desires permanent sterilization. Risks benefits and alternatives to the procedure including reversible forms of birth control were discussed, PPTL and delayed bilateral salpingectomy.  Risks of the procedure such as infection, internal bleeding are also discussed.  I think she has a good understanding of the procedure.  She verbalizes understanding and desires permanent sterilization is a result of this procedure.   Procedure: After informed consent was obtained the patient is placed in the supine position. Her epidural was dosed to a surgical level. She was prepped and draped in the normal fashion. She voided prior to the procedure. A time out was performed. A test was performed and adequate anesthesia was demonstrated verbally by the patient. A curvilinear incision was made in the lower umbilicus a 2 cm in length using allis clamps to elevate the tissue. Blunt dissection with a hemostat was then done until the fascia was identified. The fascia was grasped with 2 kocher clamps and incised with a metzenbaum scissors. The fascia was tagged with O-Vicryl. The abdomen was entered and the incision lengthened.  An minda retractor is placed.The left fallopian tube was identified and followed out to the fimbria and grasped with a evin clamp and salpingectomy performed. Hemostasis was assured with sparse Bovie cautery. Attention was turned to the right tube which was identified and followed out to the fimbria and grasped with a evin clamp and salpingectomy performed.  Hemostasis was assured with sparse Bovie cautery.  The abdomen was inspected and it was  noted that there was hemostasis.  The fascia was closed, including the peritoneum, with O-PDS in a running fashion. The skin was closed with a subcuticular stitch using 4-O Monocryl. Sterile glue was applied. Sponge, lap, and needle counts were correct x 2. The patient tolerated the procedure well.   Dr. Moira Araujo, DO   OB/GYN   [facility]

## 2019-08-30 NOTE — ANESTHESIA CARE TRANSFER NOTE
Patient: Bindu Forte    Procedure(s):  BILATERAL SALPINGECTOMY, POSTPARTUM TUBAL LIGATION    Diagnosis: .  Diagnosis Additional Information: No value filed.    Anesthesia Type:   General, ETT     Note:  Airway :Face Mask  Patient transferred to:PACU  Comments: Pt to PACU, VSS, report to RNHandoff Report: Identifed the Patient, Identified the Reponsible Provider, Reviewed the pertinent medical history, Discussed the surgical course, Reviewed Intra-OP anesthesia mangement and issues during anesthesia, Set expectations for post-procedure period and Allowed opportunity for questions and acknowledgement of understanding      Vitals: (Last set prior to Anesthesia Care Transfer)    CRNA VITALS  8/30/2019 1234 - 8/30/2019 1310      8/30/2019             Resp Rate (observed):  1  (Abnormal)                 Electronically Signed By: MELONIE Capellan CRNA  August 30, 2019  1:10 PM

## 2019-08-30 NOTE — PLAN OF CARE
Doing well with self and infant cares, breast feeding independently. Ibuprofen for pain with stated relief, denies difficulty voiding.  Family present and supportive.

## 2019-08-30 NOTE — PLAN OF CARE
"Pt bonding with baby and breastfeeding well. Ibu for pain and npo this am for tubal ligation scheduled for 12:30pm. Daughter at bedside (14yo) and Welsh intepreter present and helpful with paperwork and preop for surgery. Dizzy and MD aware. VSS. Encouraged deep breathing and slow movement getting up out of bed. Patients mobililty level scored using the bedside mobility assistance tool (BMAT). Patient is at a mobility level test number: 3. Mobility equipment used: none required. Required assist of 1 staff members. Further use of BMAT scoring required.    1400 Pt arrived back from tubal. Had been very awake and chatty in PACU but now sleepy. VSS. She rated pain  \"6-7\" but can't keep eyes open falling back to sleep. Ice to incision dermabond umbilicus. States she is very dizzy so lowered HOB and encouraged deep breathing. Will monitor.  "

## 2019-08-31 VITALS
SYSTOLIC BLOOD PRESSURE: 128 MMHG | HEART RATE: 70 BPM | OXYGEN SATURATION: 97 % | BODY MASS INDEX: 31.38 KG/M2 | TEMPERATURE: 98.6 F | RESPIRATION RATE: 16 BRPM | HEIGHT: 66 IN | DIASTOLIC BLOOD PRESSURE: 63 MMHG

## 2019-08-31 PROCEDURE — 25000132 ZZH RX MED GY IP 250 OP 250 PS 637: Performed by: FAMILY MEDICINE

## 2019-08-31 RX ORDER — OXYCODONE HYDROCHLORIDE 5 MG/1
5 TABLET ORAL EVERY 4 HOURS PRN
Qty: 8 TABLET | Refills: 0 | Status: SHIPPED | OUTPATIENT
Start: 2019-08-31 | End: 2019-10-25

## 2019-08-31 RX ORDER — IBUPROFEN 200 MG
400 TABLET ORAL EVERY 4 HOURS PRN
Status: SHIPPED | OUTPATIENT
Start: 2019-08-31 | End: 2019-10-25

## 2019-08-31 RX ADMIN — SENNOSIDES AND DOCUSATE SODIUM 1 TABLET: 8.6; 5 TABLET ORAL at 09:54

## 2019-08-31 RX ADMIN — IBUPROFEN 800 MG: 800 TABLET ORAL at 09:53

## 2019-08-31 RX ADMIN — IBUPROFEN 800 MG: 800 TABLET ORAL at 03:58

## 2019-08-31 NOTE — PLAN OF CARE
Vital signs stable. Pain controlled with Ibuprofen. Pt has been having some dizziness and getting assistance when out of bed.  Dizziness discussed with Dr. Shah -orders for discharge today. Pt is breastfeeding independently.  No sign/symptoms of infection noted.  Bonding established with baby.      present for education and discharge instructions.  Instructed pt to arrange for family to assist her at home due to her continued dizziness and cautioned pt about getting up slowly when sitting or laying down. Pt verbalized understanding.  Discharge instructions reviewed with pt. All questions and concerns addressed.  Pt verbalized understanding through . Breast pump requested by pt and given to her to take home. Discharge medications given to pt.  Pt up and dressed with assistance from  and into the wheelchair without difficulty.  Pt discharged home in stable conditions with all of her belongings accompanied by her  and family at 1220.

## 2019-08-31 NOTE — DISCHARGE INSTRUCTIONS
Vaginal Delivery Discharge Instructions: [facility] Lactation:  [phone]  Follow up in clinic in 2 weeks and 6 weeks  Actividad:     Pida a los miembros de mehta jayda y amigos que la ayuden cuando lo necesite.    No ponga nada en mehta vagina hasta que mehta médico lo permita.    Tómese las próximas semanas con calma para que mehta cuerpo tenga tiempo de recuperarse. En maine momento puede hacer cualquier actividad que sienta que puede.    No conduzca si está tomando píldoras para el dolor recetadas por mehta médico. Puede conducir si está tomando píldoras de venta becky para el dolor.    Llame a mehta proveedor de atención médica si tiene alguno de estos síntomas:    Empapa randal toalla femenina con bob en el correr de 1 hora o ve coágulos más grandes que randal pelota de golf.    Sangrado que dura más de 6 semanas.    Tiene randal secreción vaginal que huele mal.     Fiebre de 100.4  F (38  C) o más (temperatura tomada bajo mehta lengua) con o sin escalofríos     Dolor, calambres o sensibilidad graves en la región inferior de mehta vientre.    Aumento del dolor, hinchazón, enrojecimiento o líquido alrededor de henrik puntos.    Necesidad más frecuente o urgente de orinar (hacer pis), o ardor al hacerlo.    Enrojecimiento, hinchazón o dolor alrededor de randal vena en mehta pierna.    Problemas para amamantar o un área enrojecida o dolorosa en mehta pecho.    Dolor que aumenta o no se va de randal episiotomía o desgarro en el perineo.    Náuseas y vómitos    Dolor en el pecho y tos o dificultad para respirar.    Problemas para manejar la tristeza, ansiedad o depresión.     Si le preocupa hacerse daño o hacerle daño al bebé, llame al médico de inmediato.     Tiene preguntas o inquietudes después de regresar a casa.    Mantenga henrik cecilio limpias:  Lávese siempre las cecilio antes de tocar el área de mehta perineo y los puntos.  Kopperl ayuda a reducir mehta riesgo de infección.  Si henrik cecilio no están sucias, puede usar un gel de alcohol para limpiarse las  cecilio. Ángelgaldino henrik whittingtons cortas y dsetiny.      Vaginal Delivery Discharge Instructions  Activity:     Ask family and friends for help when you need it.    Do not place anything in your vagina until your doctor approves.    Take it easy for the next few weeks to allow your body to recover. You may do any activities you feel up to at that point.    Do not drive while taking pain pills prescribed by your doctor. You may drive if taking over-the-counter pain pills.    Call your health care provider if you have any of these symptoms:    You soak a sanitary pad with blood within 1 hour, or you see blood clots larger than a golf ball.    Bleeding that lasts more than 6 weeks.    You have vaginal discharge that smells bad.     A fever of 100.4  F (38  C) or higher (temperature taken under your tongue), with or without chills     Severe, pain, cramping or tenderness in your lower belly area.    Increased pain, swelling, redness or fluid around your stitches.    A more frequent or urgent need to urinate (pee), or it burns when you pee.    Redness, swelling or pain around a vein in your leg.    Problems breastfeeding, or a red or painful area on your breast.    Pain that increases or does not go away from an episiotomy or perineal tear.    Nausea and vomiting.    Chest pain and cough or are gasping for air.    Problems coping with sadness, anxiety, or depression.     If you have any concerns about hurting yourself or the baby, call your doctor right away.     You have questions or concerns after you return home.    Keep your hands clean:  Always wash your hands before touching your perineal area and stitches.  This helps reduce your risk of infection.  If your hands aren t dirty, you may use an alcohol hand-rub to clean your hands. Keep your nails clean and short.

## 2019-08-31 NOTE — PLAN OF CARE
"Patient had tubal completed 8/30. Bonding well with infant (Bhumika).  Fundus firm, midline; Lochia rubra, scant.   Feeding: Breastfeeding infant with no assistance from staff.    Mobility: Patients mobililty level scored using the bedside mobility assistance tool (BMAT). Patient is at a mobility level test number: 3. Mobility equipment used: none required. Required assist of 1 staff members. Further use of BMAT scoring required.  I&O: Regular. Advance diet as tolerated, has kept down water and crackers. voiding.   Pain: Mostly below incision. Ice placed, Ibuprofen PRN.  Incision: Incision covered with gauze/tape dressing. CDI.  IV: Saline locked, flushes. Kept in place due to dizziness.   Concerns: Patient dizzy with intermittent headache, especially when up. She was dizzy on prior night shift as well. States this happened with her last child (2015) and states \"they kept me for 4 days\". No nausea, VSS. Knows to call for assistance with ambulation. She also has swollen hands since surgery, has been elevating on pillows.    "

## 2019-08-31 NOTE — PLAN OF CARE
"VSS. Eating and drinking, tolerating regular diet well. Voiding without difficulty. By the end of this shift Pt reported \"headache is gone, pain better\". Ibuprofen given for pain X 1 during this shift. Pt independent with breastfeeding. Continue to monitor.  Patient's mobility level scored using the bedside mobility assistance tool (BMAT). Pt is at mobility level test number: 3. Mobility equipment used: none. Required assist of 1 staff members. Further use of BMAT scoring required.  "

## 2019-08-31 NOTE — PROGRESS NOTES
[facility] Obstetrics Post-Partum Progress Note          Assessment and Plan:    Assessment:   Post-partum day #2  Normal spontaneous vaginal delivery  38 y/o   with multiparity desires permanent sterilization.   Procedure: Post partum tubal ligation via bilateral salpingectomy   L&D complications: None      Doing well.  Clean wound without signs of infection.  Normal healing wound.  No immediate surgical complications identified.  No excessive bleeding  Pain well-controlled.      Plan:   Ambulation encouraged  Breast feeding strategies discussed  Monitor wound for signs of infection  Pain control measures as needed  Reportable signs and symptoms dicussed with the patient  No lifting > 10 lbs for 6 wks  Take your tempature twice daily for 3 days and call if > 100.4  Nothing in vagina for 6 wks  Continue taking prenatal MVI daily for 1 month  RTC 2&6 wks  Discharge later today           Interval History:   Doing well.  Pain is well-controlled.  No fevers.  No history of foul-smelling vaginal discharge.  Good appetite.  Denies chest pain, shortness of breath, nausea or vomiting.  Vaginal bleeding is similar to a heavy menstrual flow.  Ambulatory.  Breastfeeding well.          Significant Problems:      Past Medical History:   Diagnosis Date     Postpartum depression     currently being treated for anxiety and depression denies any problems at present on mreds             Review of Systems:    The patient denies any chest pain, shortness of breath, excessive pain, fever, chills, purulent drainage from the wound, nausea or vomiting.          Medications:     All medications related to the patient's surgery have been reviewed  Current Facility-Administered Medications   Medication     bisacodyl (DULCOLAX) Suppository 10 mg     HYDROcodone-acetaminophen (NORCO) 5-325 MG per tablet 1 tablet     hydrocortisone 2.5 % cream     ibuprofen (ADVIL/MOTRIN) tablet 800 mg     lanolin ointment     lidocaine (LMX4)  cream     lidocaine 1 % 0.1-1 mL     misoprostol (cervical ripening) (CYTOTEC) quarter-tab 25 mcg     naloxone (NARCAN) injection 0.1-0.4 mg     No MMR Needed - Assessment: Patient does not need MMR vaccine     NO Rho (D) immune globulin (RhoGam) needed - mother Rh POSITIVE     No Tdap Needed - Assessment: Patient does not need Tdap vaccine     ondansetron (ZOFRAN) injection 4 mg     oxytocin (PITOCIN) 30 units in 500 mL 0.9% NaCl infusion     oxytocin (PITOCIN) 30 units in 500 mL 0.9% NaCl infusion     oxytocin (PITOCIN) 30 units in 500 mL 0.9% NaCl infusion     oxytocin (PITOCIN) injection 10 Units     senna-docusate (SENOKOT-S/PERICOLACE) 8.6-50 MG per tablet 1 tablet    Or     senna-docusate (SENOKOT-S/PERICOLACE) 8.6-50 MG per tablet 2 tablet     sodium chloride (PF) 0.9% PF flush 3 mL     sodium chloride (PF) 0.9% PF flush 3 mL     sodium phosphate (FLEET ENEMA) 1 enema     tranexamic acid (CYKLOKAPRON) infusion 1 g             Physical Exam:   Vitals were reviewed  All vitals stable  Temp: 98.6  F (37  C) Temp src: Oral BP: 128/63 Pulse: 70 Heart Rate: 74 Resp: 16 SpO2: 97 % O2 Device: None (Room air)    Uterine fundus is firm, minimallly-tender and at the level of the umbilicus  Inc clean and intact          Data:     All laboratory data related to this surgery reviewed  Hemoglobin   Date Value Ref Range Status   08/30/2019 10.9 (L) 11.7 - 15.7 g/dL Final   08/06/2019 11.2 (L) 11.7 - 15.7 g/dL Final   07/23/2019 11.4 (L) 11.7 - 15.7 g/dL Final   06/11/2019 11.8 11.7 - 15.7 g/dL Final   04/03/2019 11.7 11.7 - 15.7 g/dL Final     No imaging studies have been ordered    Alessio Shah MD

## 2019-08-31 NOTE — PROGRESS NOTES
[facility] Obstetrics   Discharge Summary Note          Assessment and Plan:    Assessment:   Post-partum day #2  Normal spontaneous vaginal delivery  38 y/o   with multiparity desires permanent sterilization.   Procedure: Post partum tubal ligation via bilateral salpingectomy   L&D complications: None      Doing well.  Clean wound without signs of infection.  Normal healing wound.  No immediate surgical complications identified.  No excessive bleeding  Pain well-controlled.      Plan:   Ambulation encouraged  Breast feeding strategies discussed  Monitor wound for signs of infection  Pain control measures as needed  Reportable signs and symptoms dicussed with the patient  No lifting > 10 lbs for 6 wks  Take your tempature twice daily for 3 days and call if > 100.4  Nothing in vagina for 6 wks  Continue taking prenatal MVI daily for 1 month  RTC 2&6 wks  Discharge later today           Interval History:   Doing well.  Pain is well-controlled.  No fevers.  No history of foul-smelling vaginal discharge.  Good appetite.  Denies chest pain, shortness of breath, nausea or vomiting.  Vaginal bleeding is similar to a heavy menstrual flow.  Ambulatory.  Breastfeeding well.          Significant Problems:      Past Medical History:   Diagnosis Date     Postpartum depression     currently being treated for anxiety and depression [facility]             Review of Systems:    The patient denies any chest pain, shortness of breath, excessive pain, fever, chills, purulent drainage from the wound, nausea or vomiting.          Medications:     All medications related to the patient's surgery have been reviewed  Current Facility-Administered Medications   Medication     bisacodyl (DULCOLAX) Suppository 10 mg     HYDROcodone-acetaminophen (NORCO) 5-325 MG per tablet 1 tablet     hydrocortisone 2.5 % cream     ibuprofen (ADVIL/MOTRIN) tablet 800 mg     lanolin ointment     lidocaine (LMX4) cream      lidocaine 1 % 0.1-1 mL     misoprostol (cervical ripening) (CYTOTEC) quarter-tab 25 mcg     naloxone (NARCAN) injection 0.1-0.4 mg     No MMR Needed - Assessment: Patient does not need MMR vaccine     NO Rho (D) immune globulin (RhoGam) needed - mother Rh POSITIVE     No Tdap Needed - Assessment: Patient does not need Tdap vaccine     ondansetron (ZOFRAN) injection 4 mg     oxytocin (PITOCIN) 30 units in 500 mL 0.9% NaCl infusion     oxytocin (PITOCIN) 30 units in 500 mL 0.9% NaCl infusion     oxytocin (PITOCIN) 30 units in 500 mL 0.9% NaCl infusion     oxytocin (PITOCIN) injection 10 Units     senna-docusate (SENOKOT-S/PERICOLACE) 8.6-50 MG per tablet 1 tablet    Or     senna-docusate (SENOKOT-S/PERICOLACE) 8.6-50 MG per tablet 2 tablet     sodium chloride (PF) 0.9% PF flush 3 mL     sodium chloride (PF) 0.9% PF flush 3 mL     sodium phosphate (FLEET ENEMA) 1 enema     tranexamic acid (CYKLOKAPRON) infusion 1 g             Physical Exam:   Vitals were reviewed  All vitals stable  Temp: 98.6  F (37  C) Temp src: Oral BP: 128/63 Pulse: 70 Heart Rate: 74 Resp: 16 SpO2: 97 % O2 Device: None (Room air)    Uterine fundus is firm, minimallly-tender and at the level of the umbilicus  Inc clean and intact          Data:     All laboratory data related to this surgery reviewed  Hemoglobin   Date Value Ref Range Status   08/30/2019 10.9 (L) 11.7 - 15.7 g/dL Final   08/06/2019 11.2 (L) 11.7 - 15.7 g/dL Final   07/23/2019 11.4 (L) 11.7 - 15.7 g/dL Final   06/11/2019 11.8 11.7 - 15.7 g/dL Final   04/03/2019 11.7 11.7 - 15.7 g/dL Final     No imaging studies have been ordered    Alessio Shah MD

## 2019-09-01 LAB
BLD PROD TYP BPU: NORMAL
BLD PROD TYP BPU: NORMAL
BLD UNIT ID BPU: 0
BLD UNIT ID BPU: 0
BLOOD PRODUCT CODE: NORMAL
BLOOD PRODUCT CODE: NORMAL
BPU ID: NORMAL
BPU ID: NORMAL
TRANSFUSION STATUS PATIENT QL: NORMAL

## 2019-09-03 LAB — COPATH REPORT: NORMAL

## 2019-09-18 NOTE — ADDENDUM NOTE
Addendum  created 09/18/19 1858 by Bhavik Bowman MD    Intraprocedure Event edited, Intraprocedure Staff edited

## 2019-10-25 ENCOUNTER — MEDICAL CORRESPONDENCE (OUTPATIENT)
Dept: HEALTH INFORMATION MANAGEMENT | Facility: CLINIC | Age: 39
End: 2019-10-25

## 2019-10-25 ENCOUNTER — PRENATAL OFFICE VISIT (OUTPATIENT)
Dept: OBGYN | Facility: CLINIC | Age: 39
End: 2019-10-25
Payer: COMMERCIAL

## 2019-10-25 VITALS — DIASTOLIC BLOOD PRESSURE: 68 MMHG | BODY MASS INDEX: 27.94 KG/M2 | WEIGHT: 173 LBS | SYSTOLIC BLOOD PRESSURE: 124 MMHG

## 2019-10-25 DIAGNOSIS — R30.0 DYSURIA: ICD-10-CM

## 2019-10-25 DIAGNOSIS — Z12.4 SCREENING FOR MALIGNANT NEOPLASM OF CERVIX: ICD-10-CM

## 2019-10-25 DIAGNOSIS — R10.9 LEFT FLANK PAIN: ICD-10-CM

## 2019-10-25 LAB
ALBUMIN UR-MCNC: NEGATIVE MG/DL
APPEARANCE UR: CLEAR
BILIRUB UR QL STRIP: ABNORMAL
COLOR UR AUTO: YELLOW
GLUCOSE UR STRIP-MCNC: NEGATIVE MG/DL
HGB UR QL STRIP: NEGATIVE
KETONES UR STRIP-MCNC: ABNORMAL MG/DL
LEUKOCYTE ESTERASE UR QL STRIP: NEGATIVE
NITRATE UR QL: NEGATIVE
PH UR STRIP: 5 PH (ref 5–7)
SOURCE: ABNORMAL
SP GR UR STRIP: >1.03 (ref 1–1.03)
UROBILINOGEN UR STRIP-ACNC: 0.2 EU/DL (ref 0.2–1)

## 2019-10-25 PROCEDURE — G0476 HPV COMBO ASSAY CA SCREEN: HCPCS | Performed by: OBSTETRICS & GYNECOLOGY

## 2019-10-25 PROCEDURE — 99207 ZZC POST PARTUM EXAM: CPT | Performed by: OBSTETRICS & GYNECOLOGY

## 2019-10-25 PROCEDURE — 81003 URINALYSIS AUTO W/O SCOPE: CPT | Performed by: OBSTETRICS & GYNECOLOGY

## 2019-10-25 PROCEDURE — 87624 HPV HI-RISK TYP POOLED RSLT: CPT | Performed by: OBSTETRICS & GYNECOLOGY

## 2019-10-25 PROCEDURE — G0145 SCR C/V CYTO,THINLAYER,RESCR: HCPCS | Performed by: OBSTETRICS & GYNECOLOGY

## 2019-10-25 RX ORDER — CEPHALEXIN 500 MG/1
500 CAPSULE ORAL 2 TIMES DAILY
Qty: 14 CAPSULE | Refills: 0 | Status: SHIPPED | OUTPATIENT
Start: 2019-10-25 | End: 2019-11-01

## 2019-10-25 ASSESSMENT — PATIENT HEALTH QUESTIONNAIRE - PHQ9: SUM OF ALL RESPONSES TO PHQ QUESTIONS 1-9: 1

## 2019-10-25 NOTE — PROGRESS NOTES
SUBJECTIVE: Bindu is here for a 6-week postpartum checkup.    I communicated with Pt via  because Pt speaks no/limited English.    Delivery date was 2019. She had a  of a viable girl, weight 6 pounds 8 oz., with no complications.  Since delivery, she has been breast feeding.  She has no signs of infection, bleeding or other complications.  She is not pregnant.  She underwent a PP BTL.  She  has had intercourse since delivery and complains of trace discomfort. Patient screened for postpartum depression and complaints are NEGATIVE. Screening has also been completed for intimate partner violence. Pt has had 1 week of dysuria and some left flank pain that is mild. No F/C.  She is due for Pap/HPV co-test.    EXAM:  Today's Depression Rating was No flowsheet data found.    Abdomen- Abdomen soft, non-tender. BS normal. No masses, organomegaly, Mild left CVAT  Pelvic- Exam chaperoned by nurse, External genitalia normal, Bartholin's glands normal, [place]'s glands normal, Urethral meatus normal, Urethra normal, Bladder normal, Vagina with normal rugae, no abnormal lesions, no abnormal discharge, Normal cervix without lesions or mucopus, no cervical motion tenderness, Uterus normal size, shape, and contour, no masses, non-tender, Adnexa normal size without masses or tenderness bilaterally, Anus normal, Pap smear was Done,  HPV Done      ASSESSMENT:   Encounter Diagnoses   Name Primary?     Postpartum care following vaginal delivery Yes     Dysuria      Left flank pain      Screening for malignant neoplasm of cervix        PLAN:  1)  Pap/HPV done.  If all neg, next due .  2)  UA  3)  If UA shows any sx's of UTI, will Rx Keflex 500 mg BID x 7 days.  4)  If UA completely neg, then may Rx empirically but also consider a left renal U/S.  5)  Return as needed or at time of next expected pelvic, or breast exam.    Mikel Gomez MD

## 2019-10-25 NOTE — LETTER
November 4, 2019    Bindu Forte  [address] [facility] [address]    Dear Ms.Bustos Forte,  This letter is regarding your recent Pap smear (cervical cancer screening) and Human Papillomavirus (HPV) test.  We are happy to inform you that your Pap smear result is normal. Cervical cancer is closely linked with certain types of HPV. Your results showed no evidence of high-risk HPV.  We recommend you have your next PAP smear and HPV test in 3 years.  You will still need to return to the clinic every year for an annual exam and other preventive tests.  If you have additional questions regarding this result, please call our registered nurse, Tennille at [phone].  Sincerely,    Mikel Gomez MD //patrick

## 2019-10-30 LAB
COPATH REPORT: NORMAL
PAP: NORMAL

## 2019-10-31 LAB
FINAL DIAGNOSIS: NORMAL
HPV HR 12 DNA CVX QL NAA+PROBE: NEGATIVE
HPV16 DNA SPEC QL NAA+PROBE: NEGATIVE
HPV18 DNA SPEC QL NAA+PROBE: NEGATIVE
SPECIMEN DESCRIPTION: NORMAL
SPECIMEN SOURCE CVX/VAG CYTO: NORMAL

## 2020-03-04 ENCOUNTER — MEDICAL CORRESPONDENCE (OUTPATIENT)
Dept: HEALTH INFORMATION MANAGEMENT | Facility: CLINIC | Age: 40
End: 2020-03-04

## 2021-05-27 ENCOUNTER — RECORDS - HEALTHEAST (OUTPATIENT)
Dept: ADMINISTRATIVE | Facility: CLINIC | Age: 41
End: 2021-05-27

## 2021-07-26 ENCOUNTER — APPOINTMENT (OUTPATIENT)
Dept: CT IMAGING | Facility: CLINIC | Age: 41
End: 2021-07-26
Attending: PHYSICIAN ASSISTANT

## 2021-07-26 ENCOUNTER — HOSPITAL ENCOUNTER (EMERGENCY)
Facility: CLINIC | Age: 41
Discharge: HOME | End: 2021-07-26
Attending: EMERGENCY MEDICINE | Admitting: EMERGENCY MEDICINE

## 2021-07-26 VITALS
TEMPERATURE: 98.5 F | RESPIRATION RATE: 20 BRPM | SYSTOLIC BLOOD PRESSURE: 117 MMHG | OXYGEN SATURATION: 100 % | DIASTOLIC BLOOD PRESSURE: 83 MMHG | HEART RATE: 73 BPM

## 2021-07-26 DIAGNOSIS — R51.9 NONINTRACTABLE HEADACHE, UNSPECIFIED CHRONICITY PATTERN, UNSPECIFIED HEADACHE TYPE: ICD-10-CM

## 2021-07-26 LAB
ALBUMIN SERPL-MCNC: 3.8 G/DL (ref 3.4–5)
ALBUMIN UR-MCNC: NEGATIVE MG/DL
ALP SERPL-CCNC: 99 U/L (ref 40–150)
ALT SERPL W P-5'-P-CCNC: 33 U/L (ref 0–50)
ANION GAP SERPL CALCULATED.3IONS-SCNC: 3 MMOL/L (ref 3–14)
APPEARANCE UR: CLEAR
AST SERPL W P-5'-P-CCNC: 26 U/L (ref 0–45)
BACTERIA #/AREA URNS HPF: ABNORMAL /HPF
BASOPHILS # BLD AUTO: 0 10E3/UL (ref 0–0.2)
BASOPHILS NFR BLD AUTO: 0 %
BILIRUB SERPL-MCNC: 0.3 MG/DL (ref 0.2–1.3)
BILIRUB UR QL STRIP: NEGATIVE
BUN SERPL-MCNC: 9 MG/DL (ref 7–30)
CALCIUM SERPL-MCNC: 8.9 MG/DL (ref 8.5–10.1)
CHLORIDE BLD-SCNC: 105 MMOL/L (ref 94–109)
CO2 SERPL-SCNC: 28 MMOL/L (ref 20–32)
COLOR UR AUTO: ABNORMAL
CREAT SERPL-MCNC: 0.72 MG/DL (ref 0.52–1.04)
EOSINOPHIL # BLD AUTO: 0 10E3/UL (ref 0–0.7)
EOSINOPHIL NFR BLD AUTO: 0 %
ERYTHROCYTE [DISTWIDTH] IN BLOOD BY AUTOMATED COUNT: 13.5 % (ref 10–15)
GFR SERPL CREATININE-BSD FRML MDRD: >90 ML/MIN/1.73M2
GLUCOSE BLD-MCNC: 108 MG/DL (ref 70–99)
GLUCOSE UR STRIP-MCNC: NEGATIVE MG/DL
HCT VFR BLD AUTO: 39.3 % (ref 35–47)
HGB BLD-MCNC: 12.7 G/DL (ref 11.7–15.7)
HGB UR QL STRIP: NEGATIVE
HOLD SPECIMEN: NORMAL
IMM GRANULOCYTES # BLD: 0 10E3/UL
IMM GRANULOCYTES NFR BLD: 0 %
KETONES UR STRIP-MCNC: NEGATIVE MG/DL
LACTATE SERPL-SCNC: 1 MMOL/L (ref 0.7–2)
LEUKOCYTE ESTERASE UR QL STRIP: NEGATIVE
LYMPHOCYTES # BLD AUTO: 1 10E3/UL (ref 0.8–5.3)
LYMPHOCYTES NFR BLD AUTO: 10 %
MCH RBC QN AUTO: 27.5 PG (ref 26.5–33)
MCHC RBC AUTO-ENTMCNC: 32.3 G/DL (ref 31.5–36.5)
MCV RBC AUTO: 85 FL (ref 78–100)
MONOCYTES # BLD AUTO: 0.3 10E3/UL (ref 0–1.3)
MONOCYTES NFR BLD AUTO: 3 %
NEUTROPHILS # BLD AUTO: 8.2 10E3/UL (ref 1.6–8.3)
NEUTROPHILS NFR BLD AUTO: 87 %
NITRATE UR QL: NEGATIVE
NRBC # BLD AUTO: 0 10E3/UL
NRBC BLD AUTO-RTO: 0 /100
PH UR STRIP: 6.5 [PH] (ref 5–7)
PLATELET # BLD AUTO: 410 10E3/UL (ref 150–450)
POTASSIUM BLD-SCNC: 3.6 MMOL/L (ref 3.4–5.3)
PROT SERPL-MCNC: 8.2 G/DL (ref 6.8–8.8)
RBC # BLD AUTO: 4.61 10E6/UL (ref 3.8–5.2)
RBC URINE: <1 /HPF
SARS-COV-2 RNA RESP QL NAA+PROBE: NEGATIVE
SODIUM SERPL-SCNC: 136 MMOL/L (ref 133–144)
SP GR UR STRIP: 1 (ref 1–1.03)
SQUAMOUS EPITHELIAL: <1 /HPF
UROBILINOGEN UR STRIP-MCNC: NORMAL MG/DL
WBC # BLD AUTO: 9.5 10E3/UL (ref 4–11)
WBC URINE: 2 /HPF

## 2021-07-26 PROCEDURE — 96361 HYDRATE IV INFUSION ADD-ON: CPT

## 2021-07-26 PROCEDURE — 80053 COMPREHEN METABOLIC PANEL: CPT | Performed by: EMERGENCY MEDICINE

## 2021-07-26 PROCEDURE — 83605 ASSAY OF LACTIC ACID: CPT | Performed by: EMERGENCY MEDICINE

## 2021-07-26 PROCEDURE — 96375 TX/PRO/DX INJ NEW DRUG ADDON: CPT

## 2021-07-26 PROCEDURE — 70450 CT HEAD/BRAIN W/O DYE: CPT

## 2021-07-26 PROCEDURE — 81001 URINALYSIS AUTO W/SCOPE: CPT | Performed by: EMERGENCY MEDICINE

## 2021-07-26 PROCEDURE — 96365 THER/PROPH/DIAG IV INF INIT: CPT

## 2021-07-26 PROCEDURE — 85025 COMPLETE CBC W/AUTO DIFF WBC: CPT | Performed by: EMERGENCY MEDICINE

## 2021-07-26 PROCEDURE — 99285 EMERGENCY DEPT VISIT HI MDM: CPT | Mod: 25

## 2021-07-26 PROCEDURE — C9803 HOPD COVID-19 SPEC COLLECT: HCPCS

## 2021-07-26 PROCEDURE — 36415 COLL VENOUS BLD VENIPUNCTURE: CPT | Performed by: EMERGENCY MEDICINE

## 2021-07-26 PROCEDURE — 250N000011 HC RX IP 250 OP 636: Performed by: EMERGENCY MEDICINE

## 2021-07-26 PROCEDURE — 258N000003 HC RX IP 258 OP 636: Performed by: EMERGENCY MEDICINE

## 2021-07-26 PROCEDURE — 87635 SARS-COV-2 COVID-19 AMP PRB: CPT | Performed by: EMERGENCY MEDICINE

## 2021-07-26 RX ORDER — MAGNESIUM SULFATE HEPTAHYDRATE 40 MG/ML
2 INJECTION, SOLUTION INTRAVENOUS ONCE
Status: COMPLETED | OUTPATIENT
Start: 2021-07-26 | End: 2021-07-26

## 2021-07-26 RX ORDER — SODIUM CHLORIDE 9 MG/ML
INJECTION, SOLUTION INTRAVENOUS CONTINUOUS
Status: DISCONTINUED | OUTPATIENT
Start: 2021-07-26 | End: 2021-07-27 | Stop reason: HOSPADM

## 2021-07-26 RX ORDER — METOCLOPRAMIDE HYDROCHLORIDE 5 MG/ML
10 INJECTION INTRAMUSCULAR; INTRAVENOUS ONCE
Status: COMPLETED | OUTPATIENT
Start: 2021-07-26 | End: 2021-07-26

## 2021-07-26 RX ORDER — KETOROLAC TROMETHAMINE 15 MG/ML
15 INJECTION, SOLUTION INTRAMUSCULAR; INTRAVENOUS ONCE
Status: COMPLETED | OUTPATIENT
Start: 2021-07-26 | End: 2021-07-26

## 2021-07-26 RX ORDER — DIPHENHYDRAMINE HYDROCHLORIDE 50 MG/ML
12.5 INJECTION INTRAMUSCULAR; INTRAVENOUS ONCE
Status: COMPLETED | OUTPATIENT
Start: 2021-07-26 | End: 2021-07-26

## 2021-07-26 RX ADMIN — SODIUM CHLORIDE 1000 ML: 9 INJECTION, SOLUTION INTRAVENOUS at 20:26

## 2021-07-26 RX ADMIN — DIPHENHYDRAMINE HYDROCHLORIDE 12.5 MG: 50 INJECTION INTRAMUSCULAR; INTRAVENOUS at 20:29

## 2021-07-26 RX ADMIN — MAGNESIUM SULFATE HEPTAHYDRATE 2 G: 2 INJECTION, SOLUTION INTRAVENOUS at 20:33

## 2021-07-26 RX ADMIN — METOCLOPRAMIDE HYDROCHLORIDE 10 MG: 5 INJECTION INTRAMUSCULAR; INTRAVENOUS at 20:30

## 2021-07-26 RX ADMIN — KETOROLAC TROMETHAMINE 15 MG: 15 INJECTION, SOLUTION INTRAMUSCULAR; INTRAVENOUS at 20:28

## 2021-07-26 ASSESSMENT — ENCOUNTER SYMPTOMS
NAUSEA: 0
FLANK PAIN: 1
HEADACHES: 1
VOMITING: 0
DYSURIA: 1

## 2021-07-26 NOTE — ED TRIAGE NOTES
Pt presents for evaluation of a sharp headache on the left side. Started 3 days ago. No hx of migraines. Has had tylenol, last dose 2 hrs ago. Tylenol has not been helping. Denies any sensitivity to light or sound, no nausea or emesis. Also c/o cough and cold symptoms (fever and body aches).

## 2021-07-27 NOTE — ED PROVIDER NOTES
History     Chief Complaint:  Headache      The history is provided by the patient.      Bindu Forte is a 41 year old female who presents with headache. The patient has had a headache for 3 days. It started gradually and then turned into pulsating pain that persisted throughout the night. She described it as a sharp pain on the left side of her head.  She has been taking tylenol for the pain and her last dose was 3 hours ago with some success. She also reports dysuria and her urine was darker than normal.  She reports that no one else is sick at home.     Additionally she endorses photophobia, as well as a numb and tingly feeling around her eyes on both sides that has accompanied the pain since it began.      Review of Systems   Gastrointestinal: Negative for nausea and vomiting.   Genitourinary: Positive for dysuria and flank pain (Right sided).        Positive for urin odor.    Neurological: Positive for headaches (Left sided).   All other systems reviewed and are negative.        Allergies:  Blood Transfusion Related (Informational Only)    Medications:    Patient denies current use of medications.     Past Medical History:    Postpartum depression   Adjustment disorder   Cardiac murmurs     Past Surgical History:    Laparotomy   Salpingectomy  Tubal ligation      Family History:    Mother - HTN  Father - HTN, hearth disease     Social History:  Patient was accompanied to the ED with daughter.    Physical Exam     Patient Vitals for the past 24 hrs:   BP Temp Temp src Pulse Resp SpO2   07/26/21 2230 117/83 -- -- 73 -- 100 %   07/26/21 2200 126/79 -- -- 75 -- 99 %   07/26/21 2130 120/74 -- -- 75 -- 98 %   07/26/21 2100 112/76 -- -- 76 -- 100 %   07/26/21 2030 (!) 126/90 -- -- 76 -- 100 %   07/26/21 2025 (!) 123/95 -- -- 80 -- 100 %   07/26/21 2024 -- -- -- -- -- 100 %   07/26/21 2021 129/87 -- -- 77 -- --   07/26/21 2010 -- -- -- -- -- 100 %   07/26/21 2009 133/83 -- -- 95 -- --   07/26/21 1709 (!)  148/112 98.5  F (36.9  C) Oral 91 20 100 %       Physical Exam  Vitals: reviewed by me  General: Pt seen on hospital [facility], pleasant, cooperative, and alert to conversation  Eyes: Tracking well, clear conjunctiva BL  ENT: MMM, midline trachea.    Lungs: No tachypnea, no accessory muscle use. No respiratory distress.   CV: Rate as above  Abd: Soft, non tender, no guarding, no rebound. Non distended  MSK: no joint effusion.  No evidence of trauma  Skin: No rash  Neuro: Clear speech and no facial droop.  Psych: Not RIS, no e/o AH/VH      Emergency Department Course   Imaging:  CT Head w/o Contrast  Final Result  IMPRESSION:  1.  Normal head CT.  Per Radiology     Laboratory:  UA with microscopic: Bacteria - few o/w WNL     Symptomatic COVID-19 Virus (Coronavirus) by PCR Nasopharyngeal swab: Negative     CMP: Glucose - 108 (H) o/w WNL (Creatinine 0.72)     Lactic Acid (Resulted: 2007): 1    CBC: WBC 9.5, HGB 12.7,      Emergency Department Course:    Reviewed:  I reviewed nursing notes, vitals, past history and care everywhere    Assessments:  1940 I obtained history and examined the patient as noted above.    I rechecked the patient and explained findings.     Interventions:  2026 NS 1L IV  2028 Toradol 15mg IV  2029 Benadryl 12.5mg IV  2030 Relgand 10mg IV    Disposition:  The patient was discharged to home.    Impression & Plan    Medical Decision Making:  This is a very pleasant 41-year-old female presents the emergency room with headache.  The possibly does fit with a migraine profile given the photophobia, the pulsating nature, the unilateral nature, and the numbness and tingling she is feeling.  In addition to a thorough work-up, we did give her a migraine cocktail, and she tells me that this has taken her pain down to a 0 out of 10.  She tells me she feels comfortable going home, and knows to take Tylenol and Motrin at home.  Regarding her urinary symptoms, curiously her urine is normal, and I do not see  any evidence of pyelonephritis or UTI.  She has no cardiac complaints, no respiratory symptoms, no fever, normal vital signs here, and a normal white count.  I do think she stable for outpatient follow-up, her daughters agree with this as well, will plan to have her see her regular doctor the next week.    Covid-19  Bindu Forte was evaluated during a global COVID-19 pandemic, which necessitated consideration that the patient might be at risk for infection with the SARS-CoV-2 virus that causes COVID-19.   Applicable protocols for evaluation were followed during the patient's care.   COVID-19 was considered as part of the patient's evaluation. The plan for testing is:  a test was obtained during this visit.     Diagnosis:    ICD-10-CM    1. Nonintractable headache, unspecified chronicity pattern, unspecified headache type  R51.9        Scribe Disclosure:  I, Kennedy Saravia, am serving as a scribe at 7:37 PM on 7/26/2021 to document services personally performed by Rhys Retana based on my observations and the provider's statements to me.      Rhys Retana MD  07/26/21 0485

## 2021-07-27 NOTE — DISCHARGE INSTRUCTIONS
As we discussed, I think you possibly had a migraine headache, given the numbness and tingling you felt in your face.  I do not see that this was a stroke, and the CT scan of your head was normal.  He also have no signs of an infection, and your blood work looks to be reassuring.  Come back to the ER immediately with any worsening symptoms, do take Tylenol and Motrin over-the- with your symptoms.  Please be absolutely certain to see your regular doctor in 1 week as well.  Come back with any other concerns.

## 2024-02-03 ENCOUNTER — APPOINTMENT (OUTPATIENT)
Dept: CT IMAGING | Facility: CLINIC | Age: 44
End: 2024-02-03
Attending: PHYSICIAN ASSISTANT

## 2024-02-03 ENCOUNTER — APPOINTMENT (OUTPATIENT)
Dept: GENERAL RADIOLOGY | Facility: CLINIC | Age: 44
End: 2024-02-03
Attending: PHYSICIAN ASSISTANT

## 2024-02-03 ENCOUNTER — HOSPITAL ENCOUNTER (EMERGENCY)
Facility: CLINIC | Age: 44
Discharge: HOME | End: 2024-02-03
Attending: PHYSICIAN ASSISTANT | Admitting: PHYSICIAN ASSISTANT

## 2024-02-03 VITALS
DIASTOLIC BLOOD PRESSURE: 81 MMHG | OXYGEN SATURATION: 100 % | RESPIRATION RATE: 18 BRPM | SYSTOLIC BLOOD PRESSURE: 129 MMHG | HEART RATE: 106 BPM | TEMPERATURE: 99 F

## 2024-02-03 DIAGNOSIS — R07.9 CHEST PAIN: ICD-10-CM

## 2024-02-03 DIAGNOSIS — J10.1 INFLUENZA A: ICD-10-CM

## 2024-02-03 DIAGNOSIS — R10.9 ABDOMINAL PAIN: ICD-10-CM

## 2024-02-03 LAB
ALBUMIN SERPL BCG-MCNC: 4.2 G/DL (ref 3.5–5.2)
ALBUMIN UR-MCNC: NEGATIVE MG/DL
ALP SERPL-CCNC: 96 U/L (ref 40–150)
ALT SERPL W P-5'-P-CCNC: 48 U/L (ref 0–50)
ANION GAP SERPL CALCULATED.3IONS-SCNC: 11 MMOL/L (ref 7–15)
APPEARANCE UR: CLEAR
AST SERPL W P-5'-P-CCNC: 43 U/L (ref 0–45)
BACTERIA #/AREA URNS HPF: ABNORMAL /HPF
BASOPHILS # BLD AUTO: 0 10E3/UL (ref 0–0.2)
BASOPHILS NFR BLD AUTO: 0 %
BILIRUB SERPL-MCNC: 0.3 MG/DL
BILIRUB UR QL STRIP: NEGATIVE
BUN SERPL-MCNC: 6 MG/DL (ref 6–20)
CALCIUM SERPL-MCNC: 8.9 MG/DL (ref 8.6–10)
CHLORIDE SERPL-SCNC: 98 MMOL/L (ref 98–107)
COLOR UR AUTO: ABNORMAL
CREAT SERPL-MCNC: 0.78 MG/DL (ref 0.51–0.95)
DEPRECATED HCO3 PLAS-SCNC: 26 MMOL/L (ref 22–29)
EGFRCR SERPLBLD CKD-EPI 2021: >90 ML/MIN/1.73M2
EOSINOPHIL # BLD AUTO: 0.1 10E3/UL (ref 0–0.7)
EOSINOPHIL NFR BLD AUTO: 3 %
ERYTHROCYTE [DISTWIDTH] IN BLOOD BY AUTOMATED COUNT: 14.2 % (ref 10–15)
FLUAV RNA SPEC QL NAA+PROBE: POSITIVE
FLUBV RNA RESP QL NAA+PROBE: NEGATIVE
GLUCOSE SERPL-MCNC: 107 MG/DL (ref 70–99)
GLUCOSE UR STRIP-MCNC: NEGATIVE MG/DL
GROUP A STREP BY PCR: NOT DETECTED
HCG SERPL QL: NEGATIVE
HCT VFR BLD AUTO: 40.6 % (ref 35–47)
HGB BLD-MCNC: 13 G/DL (ref 11.7–15.7)
HGB UR QL STRIP: ABNORMAL
IMM GRANULOCYTES # BLD: 0 10E3/UL
IMM GRANULOCYTES NFR BLD: 1 %
KETONES UR STRIP-MCNC: NEGATIVE MG/DL
LEUKOCYTE ESTERASE UR QL STRIP: NEGATIVE
LIPASE SERPL-CCNC: 14 U/L (ref 13–60)
LYMPHOCYTES # BLD AUTO: 1.1 10E3/UL (ref 0.8–5.3)
LYMPHOCYTES NFR BLD AUTO: 35 %
MCH RBC QN AUTO: 26.3 PG (ref 26.5–33)
MCHC RBC AUTO-ENTMCNC: 32 G/DL (ref 31.5–36.5)
MCV RBC AUTO: 82 FL (ref 78–100)
MONOCYTES # BLD AUTO: 0.5 10E3/UL (ref 0–1.3)
MONOCYTES NFR BLD AUTO: 17 %
MUCOUS THREADS #/AREA URNS LPF: PRESENT /LPF
NEUTROPHILS # BLD AUTO: 1.3 10E3/UL (ref 1.6–8.3)
NEUTROPHILS NFR BLD AUTO: 44 %
NITRATE UR QL: NEGATIVE
NRBC # BLD AUTO: 0 10E3/UL
NRBC BLD AUTO-RTO: 0 /100
PH UR STRIP: 6 [PH] (ref 5–7)
PLATELET # BLD AUTO: 275 10E3/UL (ref 150–450)
POTASSIUM SERPL-SCNC: 3.3 MMOL/L (ref 3.4–5.3)
PROT SERPL-MCNC: 8.3 G/DL (ref 6.4–8.3)
RBC # BLD AUTO: 4.95 10E6/UL (ref 3.8–5.2)
RBC URINE: 1 /HPF
RSV RNA SPEC NAA+PROBE: NEGATIVE
SARS-COV-2 RNA RESP QL NAA+PROBE: NEGATIVE
SODIUM SERPL-SCNC: 135 MMOL/L (ref 135–145)
SP GR UR STRIP: 1 (ref 1–1.03)
SQUAMOUS EPITHELIAL: 6 /HPF
UROBILINOGEN UR STRIP-MCNC: NORMAL MG/DL
WBC # BLD AUTO: 3 10E3/UL (ref 4–11)
WBC URINE: 1 /HPF

## 2024-02-03 PROCEDURE — 85004 AUTOMATED DIFF WBC COUNT: CPT | Performed by: PHYSICIAN ASSISTANT

## 2024-02-03 PROCEDURE — 83690 ASSAY OF LIPASE: CPT | Performed by: PHYSICIAN ASSISTANT

## 2024-02-03 PROCEDURE — 71046 X-RAY EXAM CHEST 2 VIEWS: CPT

## 2024-02-03 PROCEDURE — 250N000009 HC RX 250: Performed by: PHYSICIAN ASSISTANT

## 2024-02-03 PROCEDURE — 36415 COLL VENOUS BLD VENIPUNCTURE: CPT | Performed by: PHYSICIAN ASSISTANT

## 2024-02-03 PROCEDURE — 87651 STREP A DNA AMP PROBE: CPT | Performed by: PHYSICIAN ASSISTANT

## 2024-02-03 PROCEDURE — 250N000013 HC RX MED GY IP 250 OP 250 PS 637: Performed by: PHYSICIAN ASSISTANT

## 2024-02-03 PROCEDURE — 250N000011 HC RX IP 250 OP 636: Performed by: PHYSICIAN ASSISTANT

## 2024-02-03 PROCEDURE — 81001 URINALYSIS AUTO W/SCOPE: CPT | Performed by: PHYSICIAN ASSISTANT

## 2024-02-03 PROCEDURE — 84703 CHORIONIC GONADOTROPIN ASSAY: CPT | Performed by: PHYSICIAN ASSISTANT

## 2024-02-03 PROCEDURE — 99285 EMERGENCY DEPT VISIT HI MDM: CPT | Mod: 25

## 2024-02-03 PROCEDURE — 74177 CT ABD & PELVIS W/CONTRAST: CPT

## 2024-02-03 PROCEDURE — 87637 SARSCOV2&INF A&B&RSV AMP PRB: CPT | Performed by: PHYSICIAN ASSISTANT

## 2024-02-03 PROCEDURE — 82040 ASSAY OF SERUM ALBUMIN: CPT | Performed by: PHYSICIAN ASSISTANT

## 2024-02-03 PROCEDURE — 93005 ELECTROCARDIOGRAM TRACING: CPT

## 2024-02-03 RX ORDER — IOPAMIDOL 755 MG/ML
500 INJECTION, SOLUTION INTRAVASCULAR ONCE
Status: COMPLETED | OUTPATIENT
Start: 2024-02-03 | End: 2024-02-03

## 2024-02-03 RX ORDER — ACETAMINOPHEN 325 MG/1
975 TABLET ORAL ONCE
Status: COMPLETED | OUTPATIENT
Start: 2024-02-03 | End: 2024-02-03

## 2024-02-03 RX ADMIN — IOPAMIDOL 86 ML: 755 INJECTION, SOLUTION INTRAVENOUS at 18:22

## 2024-02-03 RX ADMIN — ACETAMINOPHEN 975 MG: 325 TABLET, FILM COATED ORAL at 17:18

## 2024-02-03 RX ADMIN — SODIUM CHLORIDE 62 ML: 9 INJECTION, SOLUTION INTRAVENOUS at 18:22

## 2024-02-03 ASSESSMENT — ACTIVITIES OF DAILY LIVING (ADL)
ADLS_ACUITY_SCORE: 35
ADLS_ACUITY_SCORE: 37

## 2024-02-03 NOTE — ED PROVIDER NOTES
History     Chief Complaint:  Flu Symptoms       The history is provided by the patient.      Bindu Forte is a 43 year old female who presents with flu symptoms for the past 5 days. Patient states she has been experiencing cough, sore throat, chest pain, abdominal pain, nausea, and fever. Patient states her highest recorded fever was at 104 F. Patient mentions she also has been having bilateral ear pain that radiates to her jaw. She had two episodes of vomiting. Denies diarrhea, dysuria, rash, or pregnancy concerns. Patient is not on medication.       Independent Historian:   Daughter - They report the history as .     Review of External Notes:   None       Medications:    None    Past Medical History:    Depression   Adjustment disorder     Past Surgical History:    Laparotomy, tubal ligation       Physical Exam   Patient Vitals for the past 24 hrs:   BP Temp Temp src Pulse Resp SpO2   02/03/24 1855 129/81 -- -- 106 18 100 %   02/03/24 1615 (!) 143/100 99  F (37.2  C) Temporal 114 20 100 %        Physical Exam  Constitutional: Pleasant. Cooperative.   Eyes: Pupils equally round and reactive  HENT: Head is normal in appearance. Erythematous oropharynx. Bilateral TMs erythematous, but translucent.  Cardiovascular: Tachycardic. Regular rhythm, without murmurs.  Respiratory: Normal respiratory effort, lungs are clear bilaterally.  GI: TTP to RLQ, otherwise non-tender, soft, non-distended. No guarding, rebound, or rigidity.  Musculoskeletal: No asymmetry of the lower extremities.  Skin: Normal, without rash.  Neurologic: Cranial nerves grossly intact, normal cognition, no focal deficits. Alert and oriented x 3.   Psychiatric: Normal affect.  Nursing notes and vital signs reviewed.      Emergency Department Course   ECG  ECG results from 02/03/24   EKG 12-lead, tracing only     Value    Systolic Blood Pressure     Diastolic Blood Pressure     Ventricular Rate 121    Atrial Rate 121    WI Interval 138     QRS Duration 72        QTc 428    P Axis 31    R AXIS 79    T Axis -27    Interpretation ECG      Sinus tachycardia  T wave abnormality, consider inferior ischemia  Abnormal ECG  When compared with ECG of 23-AUG-2018 14:12,  Inverted T waves have replaced nonspecific T wave abnormality in Inferior leads  T wave inversion now evident in Lateral leads         Imaging:  XR Chest 2 Views   Final Result   IMPRESSION: Negative chest.      CT Abdomen Pelvis w Contrast   Final Result   IMPRESSION:    1.  No acute findings or inflammatory changes in the abdomen or pelvis. No acute appendicitis.             Laboratory:  Labs Ordered and Resulted from Time of ED Arrival to Time of ED Departure   INFLUENZA A/B, RSV, & SARS-COV2 PCR - Abnormal       Result Value    Influenza A PCR Positive (*)     Influenza B PCR Negative      RSV PCR Negative      SARS CoV2 PCR Negative     COMPREHENSIVE METABOLIC PANEL - Abnormal    Sodium 135      Potassium 3.3 (*)     Carbon Dioxide (CO2) 26      Anion Gap 11      Urea Nitrogen 6.0      Creatinine 0.78      GFR Estimate >90      Calcium 8.9      Chloride 98      Glucose 107 (*)     Alkaline Phosphatase 96      AST 43      ALT 48      Protein Total 8.3      Albumin 4.2      Bilirubin Total 0.3     ROUTINE UA WITH MICROSCOPIC REFLEX TO CULTURE - Abnormal    Color Urine Light Yellow      Appearance Urine Clear      Glucose Urine Negative      Bilirubin Urine Negative      Ketones Urine Negative      Specific Gravity Urine 1.005      Blood Urine Trace (*)     pH Urine 6.0      Protein Albumin Urine Negative      Urobilinogen Urine Normal      Nitrite Urine Negative      Leukocyte Esterase Urine Negative      Bacteria Urine Few (*)     Mucus Urine Present (*)     RBC Urine 1      WBC Urine 1      Squamous Epithelials Urine 6 (*)    CBC WITH PLATELETS AND DIFFERENTIAL - Abnormal    WBC Count 3.0 (*)     RBC Count 4.95      Hemoglobin 13.0      Hematocrit 40.6      MCV 82      MCH 26.3 (*)      MCHC 32.0      RDW 14.2      Platelet Count 275      % Neutrophils 44      % Lymphocytes 35      % Monocytes 17      % Eosinophils 3      % Basophils 0      % Immature Granulocytes 1      NRBCs per 100 WBC 0      Absolute Neutrophils 1.3 (*)     Absolute Lymphocytes 1.1      Absolute Monocytes 0.5      Absolute Eosinophils 0.1      Absolute Basophils 0.0      Absolute Immature Granulocytes 0.0      Absolute NRBCs 0.0     LIPASE - Normal    Lipase 14     HCG QUALITATIVE PREGNANCY - Normal    hCG Serum Qualitative Negative     GROUP A STREPTOCOCCUS PCR THROAT SWAB - Normal    Group A strep by PCR Not Detected              Emergency Department Course & Assessments:        Interventions:  Medications   acetaminophen (TYLENOL) tablet 975 mg (975 mg Oral $Given 2/3/24 1718)   CT Scan Flush (62 mLs Intravenous $Given 2/3/24 1822)   iopamidol (ISOVUE-370) solution 500 mL (86 mLs Intravenous $Given 2/3/24 1822)        Assessments:  1632 I obtained history and examined the patient as noted above.   1918 I rechecked and updated the patient.     Independent Interpretation (X-rays, CTs, rhythm strip):  I personally evaluated the CXR, no obvious PTX, PNA.    Consultations/Discussion of Management or Tests:  None        Social Determinants of Health affecting care:   None    Disposition:  The patient was discharged.     Impression & Plan        Medical Decision Making:  Bindu Forte is a 43 year old female who presents to the ED for evaluation of cough, sore throat, chest pain, abdominal pain, fever. See HPI as above for additional details. Vitals and physical exam as above. DDx was broad and included influenza, COVID, RSV, strep, OM, OE, PTA, RPA, epiglottitis, bacterial PNA, intraabdominal pathology such as appendicitis, amongst others. Work up obtained as above returns positive for influenza A. No evidence for alternative acute nefarious pathology identified based upon the remainder of the work up as above.  Discussed conservative cares including Tylenol, ibuprofen, hydration. [place] patient was safe for discharge to home. Discussed reasons to return. All questions answered. Patient discharged to home in stable condition.    Diagnosis:    ICD-10-CM    1. Influenza A  J10.1       2. Chest pain  R07.9       3. Abdominal pain  R10.9            Discharge Medications:  New Prescriptions    No medications on file          Scribe Disclosure:  I, Tommie Gardnercarrie Hidalgo, am serving as a scribe at 4:48 PM on 2/3/2024 to document services personally performed by Travis Espitia PA-C based on my observations and the provider's statements to me.   2/3/2024   Travis Espitia PA-C     This record was created at least in part using electronic voice recognition software, so please excuse any typographical errors.       Travis Espitia PA-C  02/03/24 1949

## 2024-02-03 NOTE — ED TRIAGE NOTES
Pt arrives with two days of fever, nausea, cough, congestion, and ear pain. ABCs intact.     BP (!) 143/100   Pulse 114   Temp 99  F (37.2  C) (Temporal)   Resp 20   SpO2 100%        Triage Assessment (Adult)       Row Name 02/03/24 1616          Triage Assessment    Airway WDL WDL        Respiratory WDL    Respiratory WDL X;cough        Cardiac WDL    Cardiac WDL WDL        Cognitive/Neuro/Behavioral WDL    Cognitive/Neuro/Behavioral WDL WDL

## 2024-02-03 NOTE — LETTER
February 3, 2024      To Whom It May Concern:      Bindu Forte was seen in our Emergency Department today, 02/03/24.  I expect her condition to improve over the next few days.  She may return to work/school when no fever for 24 hours.    Sincerely,        Mary Son RN

## 2024-02-05 LAB
ATRIAL RATE - MUSE: 121 BPM
DIASTOLIC BLOOD PRESSURE - MUSE: NORMAL MMHG
INTERPRETATION ECG - MUSE: NORMAL
P AXIS - MUSE: 31 DEGREES
PR INTERVAL - MUSE: 138 MS
QRS DURATION - MUSE: 72 MS
QT - MUSE: 302 MS
QTC - MUSE: 428 MS
R AXIS - MUSE: 79 DEGREES
SYSTOLIC BLOOD PRESSURE - MUSE: NORMAL MMHG
T AXIS - MUSE: -27 DEGREES
VENTRICULAR RATE- MUSE: 121 BPM

## 2025-03-27 NOTE — NURSING NOTE
"Chief Complaint   Patient presents with     Post Partum Exam      2019 6# 8oz girl        Initial /68 (BP Location: Right arm, Patient Position: Sitting, Cuff Size: Adult Regular)   Wt 78.5 kg (173 lb)   LMP 2018   Breastfeeding? Yes   BMI 27.94 kg/m   Estimated body mass index is 27.94 kg/m  as calculated from the following:    Height as of 19: 1.676 m (5' 5.98\").    Weight as of this encounter: 78.5 kg (173 lb).  BP completed using cuff size: regular    Questioned patient about current smoking habits.  Pt. has never smoked.          The following HM Due: NONE    Pee Moya CMA                " Case Management Discharge Planning    Admission Date: 3/22/2025  GMLOS: 2.9  ALOS: 5    6-Clicks ADL Score: 24  6-Clicks Mobility Score: 24      Anticipated Discharge Dispo: Discharge Disposition: Discharged to home/self care (01)    DME Needed: No    Action(s) Taken: RNCM completed chart review, patient will remain inpatient for treatment of bacteremia.     Escalations Completed: None    Medically Clear: No    Next Steps: pending medical clearance     Barriers to Discharge: Medical clearance    Is the patient up for discharge tomorrow: No

## 2025-06-24 ENCOUNTER — APPOINTMENT (OUTPATIENT)
Dept: MRI IMAGING | Facility: CLINIC | Age: 45
End: 2025-06-24
Attending: STUDENT IN AN ORGANIZED HEALTH CARE EDUCATION/TRAINING PROGRAM

## 2025-06-24 ENCOUNTER — APPOINTMENT (OUTPATIENT)
Dept: GENERAL RADIOLOGY | Facility: CLINIC | Age: 45
End: 2025-06-24
Attending: STUDENT IN AN ORGANIZED HEALTH CARE EDUCATION/TRAINING PROGRAM

## 2025-06-24 ENCOUNTER — HOSPITAL ENCOUNTER (EMERGENCY)
Facility: CLINIC | Age: 45
Discharge: HOME | End: 2025-06-24
Attending: STUDENT IN AN ORGANIZED HEALTH CARE EDUCATION/TRAINING PROGRAM | Admitting: STUDENT IN AN ORGANIZED HEALTH CARE EDUCATION/TRAINING PROGRAM

## 2025-06-24 VITALS
TEMPERATURE: 98.8 F | WEIGHT: 188.27 LBS | HEART RATE: 82 BPM | BODY MASS INDEX: 32.14 KG/M2 | OXYGEN SATURATION: 100 % | RESPIRATION RATE: 18 BRPM | DIASTOLIC BLOOD PRESSURE: 81 MMHG | HEIGHT: 64 IN | SYSTOLIC BLOOD PRESSURE: 137 MMHG

## 2025-06-24 DIAGNOSIS — R42 DIZZINESS: ICD-10-CM

## 2025-06-24 DIAGNOSIS — R42 VERTIGO: ICD-10-CM

## 2025-06-24 DIAGNOSIS — I10 HYPERTENSION, UNSPECIFIED TYPE: ICD-10-CM

## 2025-06-24 LAB
ALBUMIN SERPL BCG-MCNC: 4.4 G/DL (ref 3.5–5.2)
ALP SERPL-CCNC: 75 U/L (ref 40–150)
ALT SERPL W P-5'-P-CCNC: 12 U/L (ref 0–50)
ANION GAP SERPL CALCULATED.3IONS-SCNC: 9 MMOL/L (ref 7–15)
AST SERPL W P-5'-P-CCNC: 16 U/L (ref 0–45)
BASOPHILS # BLD AUTO: 0 10E3/UL (ref 0–0.2)
BASOPHILS NFR BLD AUTO: 0 %
BILIRUB DIRECT SERPL-MCNC: 0.11 MG/DL (ref 0–0.3)
BILIRUB SERPL-MCNC: 0.4 MG/DL
BUN SERPL-MCNC: 10.5 MG/DL (ref 6–20)
CALCIUM SERPL-MCNC: 9.5 MG/DL (ref 8.8–10.4)
CHLORIDE SERPL-SCNC: 100 MMOL/L (ref 98–107)
CREAT SERPL-MCNC: 0.7 MG/DL (ref 0.51–0.95)
D DIMER PPP FEU-MCNC: 0.27 UG/ML FEU (ref 0–0.5)
EGFRCR SERPLBLD CKD-EPI 2021: >90 ML/MIN/1.73M2
EOSINOPHIL # BLD AUTO: 0.1 10E3/UL (ref 0–0.7)
EOSINOPHIL NFR BLD AUTO: 1 %
ERYTHROCYTE [DISTWIDTH] IN BLOOD BY AUTOMATED COUNT: 13.9 % (ref 10–15)
GLUCOSE SERPL-MCNC: 139 MG/DL (ref 70–99)
HCG SERPL QL: NEGATIVE
HCO3 SERPL-SCNC: 23 MMOL/L (ref 22–29)
HCT VFR BLD AUTO: 41.1 % (ref 35–47)
HGB BLD-MCNC: 13.4 G/DL (ref 11.7–15.7)
HOLD SPECIMEN: NORMAL
HOLD SPECIMEN: NORMAL
IMM GRANULOCYTES # BLD: 0 10E3/UL
IMM GRANULOCYTES NFR BLD: 0 %
LIPASE SERPL-CCNC: 20 U/L (ref 13–60)
LYMPHOCYTES # BLD AUTO: 1.6 10E3/UL (ref 0.8–5.3)
LYMPHOCYTES NFR BLD AUTO: 17 %
MAGNESIUM SERPL-MCNC: 2 MG/DL (ref 1.7–2.3)
MCH RBC QN AUTO: 26.9 PG (ref 26.5–33)
MCHC RBC AUTO-ENTMCNC: 32.6 G/DL (ref 31.5–36.5)
MCV RBC AUTO: 82 FL (ref 78–100)
MONOCYTES # BLD AUTO: 0.4 10E3/UL (ref 0–1.3)
MONOCYTES NFR BLD AUTO: 5 %
NEUTROPHILS # BLD AUTO: 7.3 10E3/UL (ref 1.6–8.3)
NEUTROPHILS NFR BLD AUTO: 77 %
NRBC # BLD AUTO: 0 10E3/UL
NRBC BLD AUTO-RTO: 0 /100
PLATELET # BLD AUTO: 418 10E3/UL (ref 150–450)
POTASSIUM SERPL-SCNC: 3.7 MMOL/L (ref 3.4–5.3)
PROT SERPL-MCNC: 7.8 G/DL (ref 6.4–8.3)
RBC # BLD AUTO: 4.99 10E6/UL (ref 3.8–5.2)
SODIUM SERPL-SCNC: 132 MMOL/L (ref 135–145)
T4 FREE SERPL-MCNC: 1.16 NG/DL (ref 0.9–1.7)
TROPONIN T SERPL HS-MCNC: <6 NG/L
TSH SERPL DL<=0.005 MIU/L-ACNC: 4.23 UIU/ML (ref 0.3–4.2)
WBC # BLD AUTO: 9.5 10E3/UL (ref 4–11)

## 2025-06-24 PROCEDURE — 255N000002 HC RX 255 OP 636: Performed by: STUDENT IN AN ORGANIZED HEALTH CARE EDUCATION/TRAINING PROGRAM

## 2025-06-24 PROCEDURE — 70549 MR ANGIOGRAPH NECK W/O&W/DYE: CPT

## 2025-06-24 PROCEDURE — 36415 COLL VENOUS BLD VENIPUNCTURE: CPT | Performed by: STUDENT IN AN ORGANIZED HEALTH CARE EDUCATION/TRAINING PROGRAM

## 2025-06-24 PROCEDURE — 71046 X-RAY EXAM CHEST 2 VIEWS: CPT

## 2025-06-24 PROCEDURE — 85025 COMPLETE CBC W/AUTO DIFF WBC: CPT | Performed by: STUDENT IN AN ORGANIZED HEALTH CARE EDUCATION/TRAINING PROGRAM

## 2025-06-24 PROCEDURE — 99285 EMERGENCY DEPT VISIT HI MDM: CPT | Mod: 25

## 2025-06-24 PROCEDURE — 85379 FIBRIN DEGRADATION QUANT: CPT | Performed by: STUDENT IN AN ORGANIZED HEALTH CARE EDUCATION/TRAINING PROGRAM

## 2025-06-24 PROCEDURE — 84155 ASSAY OF PROTEIN SERUM: CPT | Performed by: STUDENT IN AN ORGANIZED HEALTH CARE EDUCATION/TRAINING PROGRAM

## 2025-06-24 PROCEDURE — 84439 ASSAY OF FREE THYROXINE: CPT | Performed by: STUDENT IN AN ORGANIZED HEALTH CARE EDUCATION/TRAINING PROGRAM

## 2025-06-24 PROCEDURE — 93005 ELECTROCARDIOGRAM TRACING: CPT

## 2025-06-24 PROCEDURE — 84484 ASSAY OF TROPONIN QUANT: CPT | Performed by: STUDENT IN AN ORGANIZED HEALTH CARE EDUCATION/TRAINING PROGRAM

## 2025-06-24 PROCEDURE — 70544 MR ANGIOGRAPHY HEAD W/O DYE: CPT

## 2025-06-24 PROCEDURE — 80048 BASIC METABOLIC PNL TOTAL CA: CPT | Performed by: STUDENT IN AN ORGANIZED HEALTH CARE EDUCATION/TRAINING PROGRAM

## 2025-06-24 PROCEDURE — 83690 ASSAY OF LIPASE: CPT | Performed by: STUDENT IN AN ORGANIZED HEALTH CARE EDUCATION/TRAINING PROGRAM

## 2025-06-24 PROCEDURE — 84703 CHORIONIC GONADOTROPIN ASSAY: CPT | Performed by: STUDENT IN AN ORGANIZED HEALTH CARE EDUCATION/TRAINING PROGRAM

## 2025-06-24 PROCEDURE — 83735 ASSAY OF MAGNESIUM: CPT | Performed by: STUDENT IN AN ORGANIZED HEALTH CARE EDUCATION/TRAINING PROGRAM

## 2025-06-24 PROCEDURE — 84443 ASSAY THYROID STIM HORMONE: CPT | Performed by: STUDENT IN AN ORGANIZED HEALTH CARE EDUCATION/TRAINING PROGRAM

## 2025-06-24 PROCEDURE — 250N000013 HC RX MED GY IP 250 OP 250 PS 637: Performed by: STUDENT IN AN ORGANIZED HEALTH CARE EDUCATION/TRAINING PROGRAM

## 2025-06-24 PROCEDURE — 70553 MRI BRAIN STEM W/O & W/DYE: CPT

## 2025-06-24 PROCEDURE — A9585 GADOBUTROL INJECTION: HCPCS | Performed by: STUDENT IN AN ORGANIZED HEALTH CARE EDUCATION/TRAINING PROGRAM

## 2025-06-24 RX ORDER — GADOBUTROL 604.72 MG/ML
10 INJECTION INTRAVENOUS ONCE
Status: COMPLETED | OUTPATIENT
Start: 2025-06-24 | End: 2025-06-24

## 2025-06-24 RX ORDER — MECLIZINE HYDROCHLORIDE 25 MG/1
25 TABLET ORAL ONCE
Status: COMPLETED | OUTPATIENT
Start: 2025-06-24 | End: 2025-06-24

## 2025-06-24 RX ORDER — MECLIZINE HYDROCHLORIDE 25 MG/1
25 TABLET ORAL 3 TIMES DAILY PRN
Qty: 20 TABLET | Refills: 0 | Status: SHIPPED | OUTPATIENT
Start: 2025-06-24

## 2025-06-24 RX ORDER — ACETAMINOPHEN 500 MG
1000 TABLET ORAL ONCE
Status: COMPLETED | OUTPATIENT
Start: 2025-06-24 | End: 2025-06-24

## 2025-06-24 RX ORDER — MECLIZINE HYDROCHLORIDE 25 MG/1
TABLET ORAL
Status: DISCONTINUED
Start: 2025-06-24 | End: 2025-06-25 | Stop reason: HOSPADM

## 2025-06-24 RX ORDER — ACETAMINOPHEN 500 MG
TABLET ORAL
Status: DISCONTINUED
Start: 2025-06-24 | End: 2025-06-25 | Stop reason: HOSPADM

## 2025-06-24 RX ADMIN — MECLIZINE HYDROCHLORIDE 25 MG: 25 TABLET ORAL at 18:23

## 2025-06-24 RX ADMIN — GADOBUTROL 10 ML: 604.72 INJECTION INTRAVENOUS at 19:56

## 2025-06-24 RX ADMIN — ACETAMINOPHEN 1000 MG: 500 TABLET, FILM COATED ORAL at 18:22

## 2025-06-24 ASSESSMENT — ACTIVITIES OF DAILY LIVING (ADL)
ADLS_ACUITY_SCORE: 44

## 2025-06-24 ASSESSMENT — COLUMBIA-SUICIDE SEVERITY RATING SCALE - C-SSRS
6. HAVE YOU EVER DONE ANYTHING, STARTED TO DO ANYTHING, OR PREPARED TO DO ANYTHING TO END YOUR LIFE?: NO
2. HAVE YOU ACTUALLY HAD ANY THOUGHTS OF KILLING YOURSELF IN THE PAST MONTH?: NO
1. IN THE PAST MONTH, HAVE YOU WISHED YOU WERE DEAD OR WISHED YOU COULD GO TO SLEEP AND NOT WAKE UP?: NO

## 2025-06-24 NOTE — ED TRIAGE NOTES
Pt presents for evaluation dizziness and headache x 1 week at the base of the head. Has been intermittent. Has been taking tylenol and aleve, which does help, last dose was yesterday. Pt went to the [facility], was sent to ED or evaluation and was told symptoms were related to HTN. EKG done, but copy not sent. Denies any vision changes, sensitivity to light or sound unilateral numbness, tingling or weakness.

## 2025-06-24 NOTE — ED PROVIDER NOTES
Emergency Department Note      History of Present Illness     Chief Complaint   Dizziness and Tachycardia      HPI   Bindu Forte is a 45 year old female who presents to the ED with her daughter for evaluation of dizziness and tachycardia. A professional  was used. Patient reports she developed room-spinning dizziness 8 days ago while lying down. She states she was scared to stand and move around at the time because she felt so off-balance. This has improved, and she only has room-spinning with movement. Patient states her dizziness is consistent with history of vertigo. Bindu also endorses headache, posterior neck pain, back pain, chest pain, and palpitations which feels like stabbing in her chest. Pain does not worsen when she takes a deep breath. She has subjective left sided numbness. Patient denies vision changes, hearing changes, speech changes, shortness of breath, abdominal pain, or fever. No recent head injury, fall, or chiropractic manipulation. Denies family history of stroke, aneurysm, or blood clots. Patient notes family history of cardiac disease in her father. Denies diagnosis of hypertension, but mentions high blood pressure readings in the past which were rechecked recently and her physician determined she should be on blood pressure medication. However she has not started any medications for hypertension yet.    Independent Historian   None    Review of External Notes   I reviewed Urgent Care note from earlier today. Went for dizziness, headache, chest pain for the past 1 week.     Past Medical History     Medical History and Problem List   Cardiac murmurs    Medications   The patient is not currently taking any prescribed medications.     Surgical History   Bilateral salpingectomy, tubal ligation    Physical Exam     Patient Vitals for the past 24 hrs:   BP Temp Temp src Pulse Resp SpO2 Height Weight   06/24/25 2200 137/81 -- -- 82 18 100 % -- --   06/24/25 2145 -- --  "-- 85 13 99 % -- --   06/24/25 2130 (!) 141/86 -- -- 88 16 100 % -- --   06/24/25 2118 (!) 152/85 -- -- 88 17 100 % -- --   06/24/25 2115 (!) 152/85 -- -- 101 23 99 % -- --   06/24/25 2100 138/82 -- -- 86 22 98 % -- --   06/24/25 2058 (!) 133/92 -- -- 86 21 99 % -- --   06/24/25 1915 (!) 151/89 -- -- 106 14 100 % -- --   06/24/25 1900 (!) 140/91 -- -- 94 18 100 % -- --   06/24/25 1815 (!) 141/85 -- -- 98 12 99 % -- --   06/24/25 1800 (!) 149/93 -- -- 93 22 100 % -- --   06/24/25 1730 (!) 158/95 -- -- 99 22 100 % -- --   06/24/25 1715 (!) 149/84 -- -- 104 22 100 % -- --   06/24/25 1655 -- -- -- 114 22 100 % -- --   06/24/25 1649 (!) 177/102 -- -- 110 -- -- -- --   06/24/25 1618 (!) 159/105 98.8  F (37.1  C) Oral (!) 122 20 100 % 1.626 m (5' 4\") 85.4 kg (188 lb 4.4 oz)     Physical Exam  General: Awake, alert, in no acute distress.  appears anxious.  HEENT: Atraumatic   EOM normal   External ears normal   Trachea midline  Neck: Supple, normal ROM  CV: Regular rate, regular rhythm   No lower extremity edema  2+ radial and DP pulses  PULM: Breath sounds normal bilaterally  No wheezes or rales  ABD: Soft, non-tender, non-distended  Normal bowel sounds   No rebound or guarding   MSK: No gross deformities  NEURO: Alert, no focal deficits. 5/5 strength in bilateral upper and lower extremities.  No gross sensory deficits.  Patient moves in a coordinated fashion.  Cranial nerves 2-12 intact.  Cerebellar testing intact  Negative head impulse test.  Negative skew.  No nystagmus.  Skin: Warm, dry and intact      Diagnostics     Lab Results   Labs Ordered and Resulted from Time of ED Arrival to Time of ED Departure   BASIC METABOLIC PANEL - Abnormal       Result Value    Sodium 132 (*)     Potassium 3.7      Chloride 100      Carbon Dioxide (CO2) 23      Anion Gap 9      Urea Nitrogen 10.5      Creatinine 0.70      GFR Estimate >90      Calcium 9.5      Glucose 139 (*)    TSH WITH FREE T4 REFLEX - Abnormal    TSH 4.23 (*)    HCG " QUALITATIVE PREGNANCY - Normal    hCG Serum Qualitative Negative     MAGNESIUM - Normal    Magnesium 2.0     D DIMER QUANTITATIVE - Normal    D-Dimer Quantitative 0.27     TROPONIN T, HIGH SENSITIVITY - Normal    Troponin T, High Sensitivity <6     HEPATIC FUNCTION PANEL - Normal    Protein Total 7.8      Albumin 4.4      Bilirubin Total 0.4      Alkaline Phosphatase 75      AST 16      ALT 12      Bilirubin Direct 0.11     LIPASE - Normal    Lipase 20     T4 FREE - Normal    Free T4 1.16     CBC WITH PLATELETS AND DIFFERENTIAL    WBC Count 9.5      RBC Count 4.99      Hemoglobin 13.4      Hematocrit 41.1      MCV 82      MCH 26.9      MCHC 32.6      RDW 13.9      Platelet Count 418      % Neutrophils 77      % Lymphocytes 17      % Monocytes 5      % Eosinophils 1      % Basophils 0      % Immature Granulocytes 0      NRBCs per 100 WBC 0      Absolute Neutrophils 7.3      Absolute Lymphocytes 1.6      Absolute Monocytes 0.4      Absolute Eosinophils 0.1      Absolute Basophils 0.0      Absolute Immature Granulocytes 0.0      Absolute NRBCs 0.0         Imaging   MR Brain w/o & w Contrast   Final Result   IMPRESSION:   HEAD MRI:    1.  No acute intracranial abnormality.   2.  Partially empty sella morphology. While potentially incidental, this can be seen in the setting of idiopathic intracranial hypertension.      HEAD MRA:    1.  No large vessel occlusion, high-grade stenosis, aneurysm, or high-flow vascular malformation.      NECK MRA:   1.  No hemodynamically-significant stenosis or dissection within the neck vessels.      MRA Angiogram Head w/o Contrast   Final Result   IMPRESSION:   HEAD MRI:    1.  No acute intracranial abnormality.   2.  Partially empty sella morphology. While potentially incidental, this can be seen in the setting of idiopathic intracranial hypertension.      HEAD MRA:    1.  No large vessel occlusion, high-grade stenosis, aneurysm, or high-flow vascular malformation.      NECK MRA:   1.  No  hemodynamically-significant stenosis or dissection within the neck vessels.      MRA Angiogram Neck w/o & w Contrast   Final Result   IMPRESSION:   HEAD MRI:    1.  No acute intracranial abnormality.   2.  Partially empty sella morphology. While potentially incidental, this can be seen in the setting of idiopathic intracranial hypertension.      HEAD MRA:    1.  No large vessel occlusion, high-grade stenosis, aneurysm, or high-flow vascular malformation.      NECK MRA:   1.  No hemodynamically-significant stenosis or dissection within the neck vessels.      Chest XR,  PA & LAT   Final Result   IMPRESSION: Negative chest. The lungs are clear. Normal cardiomediastinal silhouette.          EKG   ECG taken at 1625, ECG read at 1634  Sinus tachycardia  Nonspecific T wave abnormality  Abnormal ECG   No significant change as compared to prior, dated 02/03/2024.  Rate 116 bpm. MT interval 144 ms. QRS duration 74 ms. QT/QTc 322/447 ms. P-R-T axes 40 71 7.    Independent Interpretation   None    ED Course      Medications Administered   Medications   acetaminophen (TYLENOL) 500 MG tablet (  Canceled Entry 6/24/25 2101)   meclizine (ANTIVERT) 25 MG tablet (  Canceled Entry 6/24/25 2101)   acetaminophen (TYLENOL) tablet 1,000 mg (1,000 mg Oral $Given 6/24/25 1822)   meclizine (ANTIVERT) tablet 25 mg (25 mg Oral $Given 6/24/25 1823)   gadobutrol (GADAVIST) injection 10 mL (10 mLs Intravenous $Given 6/24/25 1956)   sodium chloride (PF) 0.9% PF flush 60 mL (100 mLs Intravenous $Given 6/24/25 1957)     Discussion of Management   None    ED Course   ED Course as of 06/24/25 2325 Tue Jun 24, 2025 1730 I obtained history and examined the patient as noted above.    2202 I rechecked the patient and explained findings. We discussed plans for discharge and the patient is comfortable with this plan.        Additional Documentation  None    Medical Decision Making / Diagnosis     CMS Diagnoses: None    MIPS   None               MDM    Bindu Forte is a 45 year old female who presents with dizziness and tachycardia as well as intermittent chest pain.  Slightly tachycardic on arrival, blood pressure in the 140s over 80s.  Otherwise vitals are WNL.  Considered broad show including hyperthyroidism, vertigo, dehydration, anxiety, atypical ACS.  Went for imaging as above which does not reveal acute pathology to explain her symptoms.  D-dimer is undetectable making PE unlikely.  MRI series as above does not reveal, intracranial atherosclerosis, strokes.  Incidental partial empty sella, likely noncontributory.  Received meclizine with improvement.  Is passing a road test without ongoing dizziness.  Likely positional vertigo.  Will refer to vestibular PT.  Blood pressure is slightly elevated but will defer antihypertensive management to primary care --referral is sent.  Tachycardia resolved completely.  TSH slightly elevated but T4 is normal.  Suspect this is likely due to anxiety.  EG is nonischemic, troponin is undetectable.  Low suspicion for atypical ACS, pericarditis today.  Is discharged home with daughter.  Return precautions discussed.  All questions answered.    Disposition   The patient was discharged.     Diagnosis     ICD-10-CM    1. Dizziness  R42 Primary Care Referral      2. Hypertension, unspecified type  I10 Primary Care Referral      3. Vertigo  R42 Physical Therapy  Referral           Discharge Medications   Discharge Medication List as of 6/24/2025 10:08 PM        START taking these medications    Details   meclizine (ANTIVERT) 25 MG tablet Take 1 tablet (25 mg) by mouth 3 times daily as needed for dizziness., Disp-20 tablet, R-0, E-Prescribe               Scribe Disclosure:  I, Margarita Moreno, am serving as a scribe at 6:09 PM on 6/24/2025 to document services personally performed by Anne Son DO based on my observations and the provider's statements to me.        Anne Son DO  06/24/25  [id number]

## 2025-06-25 LAB
ATRIAL RATE - MUSE: 116 BPM
DIASTOLIC BLOOD PRESSURE - MUSE: NORMAL MMHG
INTERPRETATION ECG - MUSE: NORMAL
P AXIS - MUSE: 40 DEGREES
PR INTERVAL - MUSE: 144 MS
QRS DURATION - MUSE: 74 MS
QT - MUSE: 322 MS
QTC - MUSE: 447 MS
R AXIS - MUSE: 71 DEGREES
SYSTOLIC BLOOD PRESSURE - MUSE: NORMAL MMHG
T AXIS - MUSE: 7 DEGREES
VENTRICULAR RATE- MUSE: 116 BPM

## 2025-06-25 NOTE — ED NOTES
Ambulated pt down ED hallway approximately 40 feet with pulse ox. Pt stated they did not feel dizzy or SOB during ambulation. Pt had a steady gait that is normal for them, pulse Ox stayed 100% room air.

## (undated) DEVICE — SPONGE LAP 4X18" X8415

## (undated) DEVICE — Device

## (undated) DEVICE — BAG CLEAR TRASH 1.3M 39X33" P4040C

## (undated) DEVICE — GLOVE PROTEXIS BLUE W/NEU-THERA 6.5  2D73EB65

## (undated) DEVICE — DRAPE LAP W/ARMBOARD 29410

## (undated) DEVICE — ADH SKIN CLOSURE PREMIERPRO EXOFIN 1.0ML 3470

## (undated) DEVICE — GLOVE PROTEXIS POWDER FREE SMT 6.0  2D72PT60X

## (undated) DEVICE — ESU LIGASURE OPEN SEALER/DIVIDER SM JAW 16.5MM LF1212A

## (undated) DEVICE — LINEN FULL SHEET 5511

## (undated) DEVICE — PAD CHUX UNDERPAD 30X36" P3036C

## (undated) DEVICE — PREP CHLORAPREP 26ML TINTED ORANGE  260815

## (undated) DEVICE — PACK MINOR CUSTOM RIDGES SBA32RMRMA

## (undated) DEVICE — SU MONOCRYL 4-0 PS-2 27" UND Y426H

## (undated) DEVICE — ESU GROUND PAD ADULT W/CORD E7507

## (undated) DEVICE — SU PDS II 0 CT-2 27" Z334H

## (undated) DEVICE — SU VICRYL 0 UR-6 27" J603H

## (undated) DEVICE — BLADE KNIFE SURG 11 371111

## (undated) DEVICE — SOL NACL 0.9% IRRIG 1000ML BOTTLE 2F7124

## (undated) DEVICE — LINEN TOWEL PACK X10 5473

## (undated) DEVICE — LINEN HALF SHEET 5512

## (undated) DEVICE — ADH SKIN CLOSURE PREMIERPRO EXOFIN MICOR HV 0.5ML 3471

## (undated) RX ORDER — ACETAMINOPHEN 325 MG/1
TABLET ORAL
Status: DISPENSED
Start: 2019-08-30

## (undated) RX ORDER — FENTANYL CITRATE 50 UG/ML
INJECTION, SOLUTION INTRAMUSCULAR; INTRAVENOUS
Status: DISPENSED
Start: 2019-08-30

## (undated) RX ORDER — LIDOCAINE HYDROCHLORIDE 10 MG/ML
INJECTION, SOLUTION EPIDURAL; INFILTRATION; INTRACAUDAL; PERINEURAL
Status: DISPENSED
Start: 2019-08-30

## (undated) RX ORDER — GLYCOPYRROLATE 0.2 MG/ML
INJECTION INTRAMUSCULAR; INTRAVENOUS
Status: DISPENSED
Start: 2019-08-30

## (undated) RX ORDER — PROPOFOL 10 MG/ML
INJECTION, EMULSION INTRAVENOUS
Status: DISPENSED
Start: 2019-08-30

## (undated) RX ORDER — CEFAZOLIN SODIUM 1 G/3ML
INJECTION, POWDER, FOR SOLUTION INTRAMUSCULAR; INTRAVENOUS
Status: DISPENSED
Start: 2019-08-30

## (undated) RX ORDER — CITRIC ACID/SODIUM CITRATE 334-500MG
SOLUTION, ORAL ORAL
Status: DISPENSED
Start: 2019-08-30

## (undated) RX ORDER — BUPIVACAINE HYDROCHLORIDE 2.5 MG/ML
INJECTION, SOLUTION EPIDURAL; INFILTRATION; INTRACAUDAL
Status: DISPENSED
Start: 2019-08-30

## (undated) RX ORDER — DEXAMETHASONE SODIUM PHOSPHATE 4 MG/ML
INJECTION, SOLUTION INTRA-ARTICULAR; INTRALESIONAL; INTRAMUSCULAR; INTRAVENOUS; SOFT TISSUE
Status: DISPENSED
Start: 2019-08-30

## (undated) RX ORDER — ONDANSETRON 2 MG/ML
INJECTION INTRAMUSCULAR; INTRAVENOUS
Status: DISPENSED
Start: 2019-08-30